# Patient Record
Sex: MALE | Race: WHITE | NOT HISPANIC OR LATINO | Employment: FULL TIME
[De-identification: names, ages, dates, MRNs, and addresses within clinical notes are randomized per-mention and may not be internally consistent; named-entity substitution may affect disease eponyms.]

---

## 2017-01-08 ENCOUNTER — RECORDS - HEALTHEAST (OUTPATIENT)
Dept: SLEEP MEDICINE | Age: 32
End: 2017-01-08

## 2017-01-08 ENCOUNTER — RECORDS - HEALTHEAST (OUTPATIENT)
Dept: ADMINISTRATIVE | Facility: OTHER | Age: 32
End: 2017-01-08

## 2017-01-08 DIAGNOSIS — G47.33 OBSTRUCTIVE SLEEP APNEA (ADULT) (PEDIATRIC): ICD-10-CM

## 2017-01-08 DIAGNOSIS — S06.0X9A CONCUSSION WITH LOSS OF CONSCIOUSNESS OF UNSPECIFIED DURATION, INITIAL ENCOUNTER: ICD-10-CM

## 2017-01-08 DIAGNOSIS — R51.9 HEADACHE: ICD-10-CM

## 2021-03-28 ENCOUNTER — HOSPITAL ENCOUNTER (EMERGENCY)
Facility: CLINIC | Age: 36
Discharge: HOME | End: 2021-03-28
Attending: EMERGENCY MEDICINE | Admitting: EMERGENCY MEDICINE
Payer: COMMERCIAL

## 2021-03-28 ENCOUNTER — OFFICE VISIT (OUTPATIENT)
Dept: URGENT CARE | Facility: URGENT CARE | Age: 36
End: 2021-03-28
Payer: COMMERCIAL

## 2021-03-28 ENCOUNTER — NURSE TRIAGE (OUTPATIENT)
Dept: NURSING | Facility: CLINIC | Age: 36
End: 2021-03-28

## 2021-03-28 VITALS
DIASTOLIC BLOOD PRESSURE: 87 MMHG | HEART RATE: 76 BPM | RESPIRATION RATE: 18 BRPM | SYSTOLIC BLOOD PRESSURE: 148 MMHG | OXYGEN SATURATION: 100 % | TEMPERATURE: 97.2 F

## 2021-03-28 VITALS
HEART RATE: 74 BPM | WEIGHT: 239 LBS | TEMPERATURE: 98.4 F | SYSTOLIC BLOOD PRESSURE: 136 MMHG | DIASTOLIC BLOOD PRESSURE: 75 MMHG | OXYGEN SATURATION: 94 %

## 2021-03-28 DIAGNOSIS — Z72.0 TOBACCO ABUSE: ICD-10-CM

## 2021-03-28 DIAGNOSIS — B02.8 HERPES ZOSTER WITH COMPLICATION: Primary | ICD-10-CM

## 2021-03-28 DIAGNOSIS — B02.9 HERPES ZOSTER WITHOUT COMPLICATION: ICD-10-CM

## 2021-03-28 PROCEDURE — 99205 OFFICE O/P NEW HI 60 MIN: CPT | Performed by: PHYSICIAN ASSISTANT

## 2021-03-28 PROCEDURE — 99283 EMERGENCY DEPT VISIT LOW MDM: CPT

## 2021-03-28 PROCEDURE — 250N000009 HC RX 250: Performed by: EMERGENCY MEDICINE

## 2021-03-28 PROCEDURE — 250N000009 HC RX 250

## 2021-03-28 RX ORDER — VALACYCLOVIR HYDROCHLORIDE 1 G/1
1000 TABLET, FILM COATED ORAL 3 TIMES DAILY
Qty: 21 TABLET | Refills: 0 | Status: SHIPPED | OUTPATIENT
Start: 2021-03-28 | End: 2022-01-14

## 2021-03-28 RX ORDER — TETRACAINE HYDROCHLORIDE 5 MG/ML
2 SOLUTION OPHTHALMIC ONCE
Status: DISCONTINUED | OUTPATIENT
Start: 2021-03-28 | End: 2021-03-28 | Stop reason: HOSPADM

## 2021-03-28 RX ORDER — BUPROPION HYDROCHLORIDE 150 MG/1
150 TABLET ORAL EVERY MORNING
COMMUNITY
End: 2022-01-14

## 2021-03-28 NOTE — ED PROVIDER NOTES
History   Chief Complaint:  Rash     The history is provided by the patient and medical records.      oM Armstrong is a 35 year old male smoker who presents with a rash. He had right sided face pain starting near his ear and somewhat radiating down as well beginning a week ago. 3 days ago he developed a red painful rash on his right forehead that his mother, who is a nurse, suggested was shingles. Today, he noticed a new small cluster of rash and his right eye seemed swollen. He called nurse triage and this prompted evaluation. He denies any vision changes or eye pain. He denies dental pain.    Review of Systems   HENT: Positive for facial swelling (right eye). Negative for dental problem.         Facial pain   Eyes: Negative for pain, redness and visual disturbance.   Skin: Positive for rash (painful, red).   All other systems reviewed and are negative.    Allergies:  The patient has no known allergies.     Medications:  Wellbutrin     Past Medical History:    The patient denies past medical history.     Past Surgical History:    ACL reconstruction  Wrist reconstruction   Concussion    Social History:  Patient presents with significant other at bedside.   Has 3 children.   Is a smoker.     Physical Exam     Patient Vitals for the past 24 hrs:   BP Temp Temp src Pulse Resp SpO2   03/28/21 1735 (!) 148/87 97.2  F (36.2  C) Temporal 76 18 100 %       Physical Exam  General: Well-developed and well-nourished. Well appearing young  man. Cooperative.  Head:  Atraumatic.  Eyes:  Conjunctivae and sclerae are normal.  There is mild edema of the right superior eyelid/supraorbital ridge.  No discharge.  PERRL.  With fluorescein staining on the right, there is no uptake including no dendritic lesions or corneal abrasion/ulceration.  Red reflex is present.  There is no hyphema or hypopyon.  No photophobia.  ENT:    Normal nose. Moist mucous membranes.  No dental tenderness or intraoral swelling/masses.  Right TM  clear.  Neck:  Supple. Normal range of motion.  Resp:  No respiratory distress.   GI:  Non-distended.    MS:  Normal ROM.   Skin:  Warm. Non-diaphoretic. No pallor. There is a cluster of erythematous vesicular lesions just superior to the right eyebrow as photographed below. Possible very small/early cluster on the right temple.  Neuro:  Awake. A&Ox3. Normal strength.  Psych: Normal mood and affect. Normal speech.  Vitals reviewed.            Emergency Department Course     Emergency Department Course:    Reviewed:  I reviewed the patient's nursing notes, vitals, past medical records, and Care Everywhere.     Assessments:  1738    I evaluated the patient and performed an exam as above.    1755 I performed an eye exam with a Wood's lamp as above. He is comfortable with discharge at this time.     Disposition:  The patient was discharged home.     Impression & Plan   Medical Decision Making:  Mo is a healthy 35-year-old man who had some facial pain for the last several days.  3 days ago he noticed a rash on his right forehead and since then has noticed another cluster in the temple starting just today.  When he noticed swelling of his eyelid, he contacted nurse triage who recommended evaluation.  Fortunately, he denies vision changes, eye pain, or any other concerns and appears quite well on exam.  He does have a cluster of vesicular lesions on the right forehead consistent with herpes zoster.  With fluorescein staining, there is no evidence of herpes ophthalmicus with no dendritic lesions, corneal abrasions, or corneal ulcerations.  However, because he continues to note new lesions, Mo understands that he could develop herpes ophthalmicus going forward.  He agrees to follow-up with ophthalmology in the next several days for reassessment to look for new dendritic lesions.  Because he is still developing lesions, he is appropriate for antiviral treatment and agrees to start Valtrex.  Mo and I discussed avoidance  of contact with elderly, pregnant, or other unimmunized individuals until his lesions have crusted over.  We also discussed return precautions including, but not limited to, eye pain, vision changes, evidence of secondary bacterial infection, fever, or any other concerns.  All of his and his wife's questions were answered and they verbalized understanding.  Amenable to discharge.    Diagnosis:    ICD-10-CM    1. Herpes zoster without complication  B02.9    2. Tobacco abuse  Z72.0        Discharge Medications:  Discharge Medication List as of 3/28/2021  6:14 PM      START taking these medications    Details   valACYclovir (VALTREX) 1000 mg tablet Take 1 tablet (1,000 mg) by mouth 3 times daily for 7 days, Disp-21 tablet, R-0, E-Prescribe             Scribe Disclosure:  I, Ankita Hassan, am serving as a scribe at 5:39 PM on 3/28/2021 to document services personally performed by Meme Henderson MD based on my observations and the provider's statements to me.      Meme Hendesron MD  03/29/21 2040

## 2021-03-28 NOTE — PATIENT INSTRUCTIONS
March 28, 2021 [facility]:     Go to the emergency room now for further evaluation of your shingles infection, your right eye droop and the swelling in your right temple area.     I feel it is important that you have a slit lamp exam of your eye. If you have Shingles lesions on your cornea, it can lead to blindness. You also appear to have developed a secondary bacterial infection (called facial cellulitis) in your right temple area where it is swollen, red and warm.

## 2021-03-28 NOTE — ED TRIAGE NOTES
Patient presents with complaints of shingles. Patient states his eye started swelling today as well. Denies any vision changes. ABC intact without need for intervention at this time.

## 2021-03-28 NOTE — DISCHARGE INSTRUCTIONS
There are no lesions in your eye today. However, since you are still developing lesions, you could get them in your eye. You need to see an ophthalmologist for a recheck to look for lesions this week.  Take Valtrex as prescribed to help stop formation of new vesicles.  Avoid contact with elderly, pregnant, or other unimmunized individuals until all lesions are completely crusted over.   Return with severe pain, blurred vision/vision change, eye pain, redness around the lesions, fever, or ANY OTHER new or concerning symptoms.  Stop smoking.

## 2021-03-28 NOTE — TELEPHONE ENCOUNTER
Pt broke with Shingles this weekend, right side of face and now around his eyes.  Pt will follow Care Advice and go to Urgent Care today.  Unique Holman RN, MA  [facility] Nurse Advisor           Additional Information    Shingles rash on the eyelid or tip of the nose    Protocols used: SHINGLES-A-AH

## 2021-03-28 NOTE — PROGRESS NOTES
ASSESSMENT/PLAN:    (B02.8) Herpes zoster with complication  (primary encounter diagnosis)    MDM:  Classic Zoster lesions--right sided ophthalmic distribution. Right sided eye droop. This is in a dermatomal pattern that is concerning for complication of right eye involvement. Additionally patient has acute development today of warmth, redness and swelling in right temple area raising suspicion for secondary facial cellulitis.     PLAN: I have advised he be seen now in ER for slit lamp evaluation of eye and for further evaluation of eye droop and possible secondary facial cellulitis. I phoned ahead to [facility] ([facility]). They are expecting his arrival shortly via private car.     Below is copy of discharge summary I provided patient. He verbalizes understanding of and agrees to this plan.    _______________________________________________________________________    March 28, 2021 [facility]:     Go to the emergency room now for further evaluation of your shingles infection, your right eye droop and the swelling in your right temple area.    I feel it is important that you have a slit lamp exam of your eye. If you have Shingles lesions on your cornea, it can lead to blindness. You also appear to have developed a secondary bacterial infection (called facial cellulitis) in your right temple area where it is swollen, red and warm.       SUBJECTIVE:     Mo Armstrong presents to  today for evaluation of painful rash on right forehead, droopy right eyelid. Now, today, he has also developed redness, heat and swelling in right temple area.     HPI: Patient noted some pain in forehead and scalp 7 days ago. Thursday (3 days ago) he developed blisters in right forehead and pain in right scalp. In past 24 hours, he has developed right eyelid drooping, redness, heat and swelling in right temple area.     Patient had Chickenpox in childhood. No prior Shingles outbreaks     ROS:   CONSITUTIONAL:  No fever, fatigue or other  systemic sxs.   SKIN: Positive as per above HPI. No rash or blisters elsewhere  NEURO: Positive for eyelid droop as per above. Patient states he had a left sided headache yesterday. No headache today.  No severe headache. Denies any loss of visual acuity.  Denies any severe headaches, neck stiffness, photophobia, mental status changes or lethargy.           OBJECTIVE:  /75   Pulse 74   Temp 98.4  F (36.9  C)   Wt 108.4 kg (239 lb)   SpO2 94%       GENERAL:  Very pleasant, comfortable and generally well appearing.  SKIN: Typical zoster lesions on erythematous bases in clusters right forehead in ophthalmic branch distribution. Patient also has warmth, swelling and tenderness in right temple area.  RIGHT EYE: Significant right upper eyelid droop is noted. Sclera clear.   NECK: FROM neck without any pain or stiffness.   NEURO: Positive for ptosis right lid.  Alert and oriented.  NormalNo eye ptosis on left. No left sided facial droop. CN II/XII grossly intact.  Gait within normal limits.

## 2021-03-29 ASSESSMENT — ENCOUNTER SYMPTOMS
FACIAL SWELLING: 1
EYE PAIN: 0
EYE REDNESS: 0

## 2021-03-31 ENCOUNTER — HOSPITAL ENCOUNTER (EMERGENCY)
Facility: CLINIC | Age: 36
Discharge: HOME | End: 2021-03-31
Attending: EMERGENCY MEDICINE | Admitting: EMERGENCY MEDICINE
Payer: COMMERCIAL

## 2021-03-31 VITALS
RESPIRATION RATE: 16 BRPM | TEMPERATURE: 97.1 F | BODY MASS INDEX: 33.6 KG/M2 | HEIGHT: 71 IN | DIASTOLIC BLOOD PRESSURE: 67 MMHG | OXYGEN SATURATION: 98 % | WEIGHT: 240 LBS | HEART RATE: 83 BPM | SYSTOLIC BLOOD PRESSURE: 123 MMHG

## 2021-03-31 DIAGNOSIS — B02.29 HERPES ZOSTER VIRUS INFECTION OF FACE AND EAR NERVES: ICD-10-CM

## 2021-03-31 PROCEDURE — 99283 EMERGENCY DEPT VISIT LOW MDM: CPT

## 2021-03-31 PROCEDURE — 250N000012 HC RX MED GY IP 250 OP 636 PS 637: Performed by: EMERGENCY MEDICINE

## 2021-03-31 RX ORDER — PREDNISONE 20 MG/1
60 TABLET ORAL DAILY
Qty: 30 TABLET | Refills: 0 | Status: SHIPPED | OUTPATIENT
Start: 2021-03-31 | End: 2021-04-10

## 2021-03-31 RX ORDER — PREDNISONE 20 MG/1
60 TABLET ORAL ONCE
Status: COMPLETED | OUTPATIENT
Start: 2021-03-31 | End: 2021-03-31

## 2021-03-31 RX ORDER — GABAPENTIN 300 MG/1
300 CAPSULE ORAL 3 TIMES DAILY PRN
Qty: 90 CAPSULE | Refills: 0 | Status: SHIPPED | OUTPATIENT
Start: 2021-03-31 | End: 2022-01-14

## 2021-03-31 RX ADMIN — PREDNISONE 60 MG: 20 TABLET ORAL at 08:18

## 2021-03-31 ASSESSMENT — ENCOUNTER SYMPTOMS
FACIAL SWELLING: 1
DIZZINESS: 0
EYE PAIN: 0
NUMBNESS: 0
NECK STIFFNESS: 0

## 2021-03-31 ASSESSMENT — MIFFLIN-ST. JEOR: SCORE: 2040.76

## 2021-03-31 NOTE — DISCHARGE INSTRUCTIONS
A prescription for the steroids was prescribed, but do not fill this unless you are unable to see ENT clinic by tomorrow. They will prescribe what the think is the best regimen.

## 2021-03-31 NOTE — ED TRIAGE NOTES
Patient seen here Sunday, diagnosed with shingles, seen at [facility] yesterday, today worse swelling right eye and right cheek. No change in vision.

## 2021-03-31 NOTE — ED PROVIDER NOTES
"History     Chief Complaint:  Shingles       The history is provided by the patient.     Mo Armstrong is a 35 year old male with a history of migraines who presents for evaluation of facial swelling. The patient first began to have ear pain and a rash above his right eyebrow ten days ago. He then began to have right eye swelling three days ago, leading him to present to the ED. He was then seen at [facility] yesterday for evaluation and his eye appeared unaffected per pt report. This morning, his right eyelid and right sided facial swelling was worsened, leading him to again present. He has also been having weakness and pain to the right side of his face for the past three days. He has been compliant with his Valtrex. He has not been on any eye drops or prednisone.     Here, he also shares that he has difficulty tasting his food last night for dinner. He denies eye pain, vision changes, dizziness, hearing changes, neck stiffness, or arm numbness or weakness.       Review of Systems   HENT: Positive for ear pain (right) and facial swelling (right side). Negative for hearing loss.         Positive for change in taste   Eyes: Negative for pain and visual disturbance.   Musculoskeletal: Negative for neck stiffness.   Skin: Positive for rash (right sided face).   Neurological: Negative for dizziness and numbness (arms).        Negative for arm tingling   All other systems reviewed and are negative.    Allergies:  No Known Drug Allergies      Medications:   Bupropion   Valtrex     Medical History:   Concussion x4  Migraines     Surgical History:  ACL repair  MCL and LCL repair, elbow   Wrist reconstruction     Social History:  The patient was unaccompanied to the ED.  Marital Status:       Physical Exam     Patient Vitals for the past 24 hrs:   BP Temp Temp src Pulse Resp SpO2 Height Weight   03/31/21 0726 (!) 149/105 97.1  F (36.2  C) Temporal 74 16 98 % 1.803 m (5' 11\") 108.9 kg (240 lb)          Physical " Exam    VS: Reviewed per above  HENT: Mucous membranes moist, no nuchal rigidity.  EYES: sclera anicteric  CV: Rate as noted, regular rhythm.   RESP: Effort normal. Breath sounds are normal bilaterally.  NEURO: GCS 15, House-Brackman grade 3-4 right facial weakness: He has facial asymmetry at rest. Complete eye closure on right with maximal effort. Able to minimally raise the right eye brow and minimal movement with attempted smile on the right.  5-5 strength in all 4 extremities, sensation intact to light touch in all 4 extremities.  MSK: No deformity of the extremities  SKIN: Warm and dry, mild right facial soft tissue swelling without redness or induration or warmth.  Scattered pustular lesions over the right eyebrow.  No lesions appreciated in the right auditory canal or auricle.       Emergency Department Course     Emergency Department Course:    Reviewed:  I reviewed the patient's nursing notes, vitals, past medical records, Care Everywhere.     Assessments:  0735 I performed an exam of the patient, as documented above.   0803 Patient rechecked and updated following consultation with ENT. He understands and is in agreement with the plan for outpatient follow up with ENT.     Consults:   0756 I spoke with Dr. Forrest of the ENT service regarding patient's presentation, findings, and plan of care.     Interventions:   Prednisone 60 mg PO    Disposition:  The patient was discharged to home.     Impression & Plan     Medical Decision Making:  Patient presents to the ER for evaluation of right facial swelling, weakness, rash.  On arrival vital signs are reassuring.  Per chart review, patient was diagnosed with zoster 3 days ago in the ER and was seen by ophthalmology yesterday, and was told that there was no involvement of the right eye.  Today patient returns for worsening swelling and pain of the right face.  At this time, he has House-Brackman grade 3 or 4 weakness on the right face.  He has some appreciable  soft tissue swelling of the face but no evidence of skin or soft tissue infection.  Despite lack of objective involvement of the right ear, I was concerned for Shaka Hunt syndrome.  I spoke with on-call otolaryngology and they recommended patient be seen in their clinic today.  They also agreed with initiating prednisone.  He was given first dose of prednisone here in the ER.  I encouraged him to continue taking the Valtrex.  I also prescribed gabapentin for nerve related pain.  I did prescribe a 10-day course of prednisone, but recommended patient hold off on filling this prescription until he receives formal recommendation about prednisone taper from otolaryngology today.      Diagnosis:     ICD-10-CM    1. Herpes zoster virus infection of face and ear nerves  B02.29         Discharge Medications:  New Prescriptions    GABAPENTIN (NEURONTIN) 300 MG CAPSULE    Take 1 capsule (300 mg) by mouth 3 times daily as needed (facial pain)    PREDNISONE (DELTASONE) 20 MG TABLET    Take 3 tablets (60 mg) by mouth daily for 10 days       Scribe Disclosure:  I, Mary Chavez, am serving as a scribe at 7:30 AM on 3/31/2021 to document services personally performed by Shakeel Buckley MD based on my observations and the provider's statements to me.      Shakeel Buckley MD  03/31/21 9790

## 2021-05-29 ENCOUNTER — RECORDS - HEALTHEAST (OUTPATIENT)
Dept: ADMINISTRATIVE | Facility: CLINIC | Age: 36
End: 2021-05-29

## 2021-09-30 ENCOUNTER — OFFICE VISIT (OUTPATIENT)
Dept: PEDIATRICS | Facility: CLINIC | Age: 36
End: 2021-09-30
Payer: COMMERCIAL

## 2021-09-30 VITALS
HEIGHT: 71 IN | WEIGHT: 230 LBS | RESPIRATION RATE: 20 BRPM | SYSTOLIC BLOOD PRESSURE: 130 MMHG | DIASTOLIC BLOOD PRESSURE: 84 MMHG | BODY MASS INDEX: 32.2 KG/M2 | HEART RATE: 88 BPM | TEMPERATURE: 98.4 F

## 2021-09-30 DIAGNOSIS — F43.23 ADJUSTMENT DISORDER WITH MIXED ANXIETY AND DEPRESSED MOOD: Primary | ICD-10-CM

## 2021-09-30 DIAGNOSIS — F17.200 SMOKER: ICD-10-CM

## 2021-09-30 PROCEDURE — 99214 OFFICE O/P EST MOD 30 MIN: CPT | Performed by: NURSE PRACTITIONER

## 2021-09-30 RX ORDER — CITALOPRAM HYDROBROMIDE 20 MG/1
TABLET ORAL
Qty: 49 TABLET | Refills: 0 | Status: SHIPPED | OUTPATIENT
Start: 2021-09-30 | End: 2022-01-14

## 2021-09-30 ASSESSMENT — ANXIETY QUESTIONNAIRES
8. IF YOU CHECKED OFF ANY PROBLEMS, HOW DIFFICULT HAVE THESE MADE IT FOR YOU TO DO YOUR WORK, TAKE CARE OF THINGS AT HOME, OR GET ALONG WITH OTHER PEOPLE?: EXTREMELY DIFFICULT
4. TROUBLE RELAXING: MORE THAN HALF THE DAYS
7. FEELING AFRAID AS IF SOMETHING AWFUL MIGHT HAPPEN: NEARLY EVERY DAY
7. FEELING AFRAID AS IF SOMETHING AWFUL MIGHT HAPPEN: NEARLY EVERY DAY
3. WORRYING TOO MUCH ABOUT DIFFERENT THINGS: NEARLY EVERY DAY
1. FEELING NERVOUS, ANXIOUS, OR ON EDGE: NEARLY EVERY DAY
5. BEING SO RESTLESS THAT IT IS HARD TO SIT STILL: MORE THAN HALF THE DAYS
GAD7 TOTAL SCORE: 19
2. NOT BEING ABLE TO STOP OR CONTROL WORRYING: NEARLY EVERY DAY
6. BECOMING EASILY ANNOYED OR IRRITABLE: NEARLY EVERY DAY
GAD7 TOTAL SCORE: 19
GAD7 TOTAL SCORE: 19

## 2021-09-30 ASSESSMENT — PATIENT HEALTH QUESTIONNAIRE - PHQ9
SUM OF ALL RESPONSES TO PHQ QUESTIONS 1-9: 17
10. IF YOU CHECKED OFF ANY PROBLEMS, HOW DIFFICULT HAVE THESE PROBLEMS MADE IT FOR YOU TO DO YOUR WORK, TAKE CARE OF THINGS AT HOME, OR GET ALONG WITH OTHER PEOPLE: EXTREMELY DIFFICULT
SUM OF ALL RESPONSES TO PHQ QUESTIONS 1-9: 17

## 2021-09-30 ASSESSMENT — MIFFLIN-ST. JEOR: SCORE: 1990.4

## 2021-09-30 NOTE — PROGRESS NOTES
"    Assessment & Plan     Adjustment disorder with mixed anxiety and depressed mood  He had previously been on wellbutrin and not helping. He has more symptoms of anxeity at this time. Will try celexa, which he has been on in the psat with good effect, although it was when he was a teenager. Discussed in depth the biochemical nature of depression/anxiety and the mechanism of action of SSRI medication.  Discussed black box warning of suicidality and instructed to go to er if she exhibits these symptoms.  Instructed to f/u in 6-8 weeks to evaluate progess.  - citalopram (CELEXA) 20 MG tablet; Take 0.5 tablets (10 mg) by mouth daily for 7 days, THEN 0.5 tablets (10 mg) daily.    Smoker  He has quit successfully with chantix in the past but understands it[place] market [place], but we can order it through [facility]. He would like to consider after getting mood improved.            Tobacco Cessation:   reports that he has been smoking cigarettes. He has been smoking about 1.00 pack per day. He has never used smokeless tobacco.  Tobacco Cessation Action Plan: Information offered: Patient not interested at this time    BMI:   Estimated body mass index is 32.08 kg/m  as calculated from the following:    Height as of this encounter: 1.803 m (5' 11\").    Weight as of this encounter: 104.3 kg (230 lb).       Depression Screening Follow Up    PHQ 9/30/2021   PHQ-9 Total Score 17   Q9: Thoughts of better off dead/self-harm past 2 weeks Not at all     Last PHQ-9 9/30/2021   1.  Little interest or pleasure in doing things 0   2.  Feeling down, depressed, or hopeless 3   3.  Trouble falling or staying asleep, or sleeping too much 3   4.  Feeling tired or having little energy 3   5.  Poor appetite or overeating 2   6.  Feeling bad about yourself 1   7.  Trouble concentrating 3   8.  Moving slowly or restless 2   Q9: Thoughts of better off dead/self-harm past 2 weeks 0   PHQ-9 Total Score 17       Follow Up Actions Taken  Crisis " resource information provided in After Visit Summary         Return in about 1 month (around 10/30/2021) for Follow up, Face to face office visit.    JOCELYNE [facility] DIANE Hassan is a 36 year old who presents for the following health issues     History of Present Illness       Mental Health Follow-up:  Patient presents to follow-up on Depression & Anxiety.Patient's depression since last visit has been:  Worse  The patient is not having other symptoms associated with depression.  Patient's anxiety since last visit has been:  Worse  The patient is not having other symptoms associated with anxiety.  Any significant life events: job concerns, financial concerns and grief or loss  Patient is feeling anxious or having panic attacks.  Patient has no concerns about alcohol or drug use.     Social History  Tobacco Use    Smoking status: Current Every Day Smoker      Packs/day: 1.00      Types: Cigarettes    Smokeless tobacco: Never Used  Alcohol use: Not on file  Drug use: Not on file      Today's PHQ-9         PHQ-9 Total Score:     (P) 17   PHQ-9 Q9 Thoughts of better off dead/self-harm past 2 weeks :   (P) Not at all   Thoughts of suicide or self harm:      Self-harm Plan:        Self-harm Action:          Safety concerns for self or others:           He eats 0-1 servings of fruits and vegetables daily.He consumes 1 sweetened beverage(s) daily.He exercises with enough effort to increase his heart rate 9 or less minutes per day.  He exercises with enough effort to increase his heart rate 3 or less days per week.   He is taking medications regularly.     History of anxiety, used to be on wellbutrin for this, and tried restarting but it didn't help. He is a smoker and wants to kid.      He is , sees kids every other weekend. Job is very busy. He doesn't drink much at all, eating out all the time, appetitive low. Denies drug use.     He used to be on celexa as a  "teen and it worked well.     Review of Systems   Constitutional, HEENT, cardiovascular, pulmonary, GI, , musculoskeletal, neuro, skin, endocrine and psych systems are negative, except as otherwise noted.      Objective    /84   Pulse 88   Temp 98.4  F (36.9  C) (Oral)   Resp 20   Ht 1.803 m (5' 11\")   Wt 104.3 kg (230 lb)   BMI 32.08 kg/m    Body mass index is 32.08 kg/m .  Physical Exam   GENERAL: healthy, alert and no distress  PSYCH: mentation appears normal, affect normal/bright            Answers for HPI/ROS submitted by the patient on 9/30/2021  If you checked off any problems, how difficult have these problems made it for you to do your work, take care of things at home, or get along with other people?: Extremely difficult  PHQ9 TOTAL SCORE: 17  ANOOP 7 TOTAL SCORE: 19          "

## 2021-09-30 NOTE — PATIENT INSTRUCTIONS
Depression and/or Anxiety:  A prescription has been faxed to your pharmacy.  Continue to work on a healthy diet and routine exercise and consider a therapist. We talked about being self-aware of boundaries, mindfulness and/or meditative practice, scheduling fun things to do, reaching out to your supports. [facility] (211) is a resource to connect you to crisis counseling (in person or by phone).  Schedule a follow up appointment in about 4-6 weeks with your progress, or sooner if you have any questions or concerns.  Be aware of the very smalll risk of increased symptoms of depression and/or anxiety when starting these medications.

## 2021-10-01 ASSESSMENT — PATIENT HEALTH QUESTIONNAIRE - PHQ9: SUM OF ALL RESPONSES TO PHQ QUESTIONS 1-9: 17

## 2021-10-01 ASSESSMENT — ANXIETY QUESTIONNAIRES: GAD7 TOTAL SCORE: 19

## 2022-01-14 ENCOUNTER — OFFICE VISIT (OUTPATIENT)
Dept: PEDIATRICS | Facility: CLINIC | Age: 37
End: 2022-01-14
Payer: COMMERCIAL

## 2022-01-14 VITALS
DIASTOLIC BLOOD PRESSURE: 72 MMHG | OXYGEN SATURATION: 100 % | HEIGHT: 71 IN | RESPIRATION RATE: 18 BRPM | SYSTOLIC BLOOD PRESSURE: 124 MMHG | HEART RATE: 74 BPM | TEMPERATURE: 97.4 F | WEIGHT: 225 LBS | BODY MASS INDEX: 31.5 KG/M2

## 2022-01-14 DIAGNOSIS — F43.23 ADJUSTMENT DISORDER WITH MIXED ANXIETY AND DEPRESSED MOOD: ICD-10-CM

## 2022-01-14 DIAGNOSIS — E87.5 SERUM POTASSIUM ELEVATED: ICD-10-CM

## 2022-01-14 DIAGNOSIS — Z01.818 PREOP GENERAL PHYSICAL EXAM: Primary | ICD-10-CM

## 2022-01-14 LAB
ANION GAP SERPL CALCULATED.3IONS-SCNC: 4 MMOL/L (ref 3–14)
BUN SERPL-MCNC: 14 MG/DL (ref 7–30)
CALCIUM SERPL-MCNC: 9.3 MG/DL (ref 8.5–10.1)
CHLORIDE BLD-SCNC: 108 MMOL/L (ref 94–109)
CO2 SERPL-SCNC: 29 MMOL/L (ref 20–32)
CREAT SERPL-MCNC: 1.1 MG/DL (ref 0.66–1.25)
ERYTHROCYTE [DISTWIDTH] IN BLOOD BY AUTOMATED COUNT: 13.1 % (ref 10–15)
GFR SERPL CREATININE-BSD FRML MDRD: 89 ML/MIN/1.73M2
GLUCOSE BLD-MCNC: 120 MG/DL (ref 70–99)
HCT VFR BLD AUTO: 48.2 % (ref 40–53)
HGB BLD-MCNC: 16.5 G/DL (ref 13.3–17.7)
MCH RBC QN AUTO: 30.2 PG (ref 26.5–33)
MCHC RBC AUTO-ENTMCNC: 34.2 G/DL (ref 31.5–36.5)
MCV RBC AUTO: 88 FL (ref 78–100)
PLATELET # BLD AUTO: 295 10E3/UL (ref 150–450)
POTASSIUM BLD-SCNC: 5.5 MMOL/L (ref 3.4–5.3)
RBC # BLD AUTO: 5.47 10E6/UL (ref 4.4–5.9)
SODIUM SERPL-SCNC: 141 MMOL/L (ref 133–144)
WBC # BLD AUTO: 6.4 10E3/UL (ref 4–11)

## 2022-01-14 PROCEDURE — 36415 COLL VENOUS BLD VENIPUNCTURE: CPT | Performed by: NURSE PRACTITIONER

## 2022-01-14 PROCEDURE — 99214 OFFICE O/P EST MOD 30 MIN: CPT | Performed by: NURSE PRACTITIONER

## 2022-01-14 PROCEDURE — 85027 COMPLETE CBC AUTOMATED: CPT | Performed by: NURSE PRACTITIONER

## 2022-01-14 PROCEDURE — 80048 BASIC METABOLIC PNL TOTAL CA: CPT | Performed by: NURSE PRACTITIONER

## 2022-01-14 PROCEDURE — 96127 BRIEF EMOTIONAL/BEHAV ASSMT: CPT | Performed by: NURSE PRACTITIONER

## 2022-01-14 RX ORDER — TRAMADOL HYDROCHLORIDE 50 MG/1
TABLET ORAL
COMMUNITY
Start: 2022-01-04 | End: 2023-02-28

## 2022-01-14 ASSESSMENT — PATIENT HEALTH QUESTIONNAIRE - PHQ9
SUM OF ALL RESPONSES TO PHQ QUESTIONS 1-9: 8
SUM OF ALL RESPONSES TO PHQ QUESTIONS 1-9: 8
10. IF YOU CHECKED OFF ANY PROBLEMS, HOW DIFFICULT HAVE THESE PROBLEMS MADE IT FOR YOU TO DO YOUR WORK, TAKE CARE OF THINGS AT HOME, OR GET ALONG WITH OTHER PEOPLE: SOMEWHAT DIFFICULT

## 2022-01-14 ASSESSMENT — ANXIETY QUESTIONNAIRES
7. FEELING AFRAID AS IF SOMETHING AWFUL MIGHT HAPPEN: SEVERAL DAYS
GAD7 TOTAL SCORE: 13
3. WORRYING TOO MUCH ABOUT DIFFERENT THINGS: MORE THAN HALF THE DAYS
GAD7 TOTAL SCORE: 13
2. NOT BEING ABLE TO STOP OR CONTROL WORRYING: MORE THAN HALF THE DAYS
6. BECOMING EASILY ANNOYED OR IRRITABLE: NEARLY EVERY DAY
7. FEELING AFRAID AS IF SOMETHING AWFUL MIGHT HAPPEN: SEVERAL DAYS
5. BEING SO RESTLESS THAT IT IS HARD TO SIT STILL: SEVERAL DAYS
1. FEELING NERVOUS, ANXIOUS, OR ON EDGE: MORE THAN HALF THE DAYS
4. TROUBLE RELAXING: MORE THAN HALF THE DAYS
GAD7 TOTAL SCORE: 13

## 2022-01-14 ASSESSMENT — MIFFLIN-ST. JEOR: SCORE: 1967.72

## 2022-01-14 NOTE — PROGRESS NOTES
[facility]  [address]  [address]  [place] [address]  Phone: [phone]  Fax: [phone]  Primary Provider: No Ref-Primary, Physician  Pre-op Performing Provider: KAMILAH ESCOBAR      PREOPERATIVE EVALUATION:  Today's date: 1/14/2022    Mo Armstrong is a 36 year old male who presents for a preoperative evaluation.    Surgical Information:  Surgery/Procedure: Spinal Fusion  Surgery Location: [facility]  Surgeon:   Surgery Date: 1/17/22  Time of Surgery: TBD  Where patient plans to recover: At home with family  Fax number for surgical facility: [phone]    Type of Anesthesia Anticipated: to be determined    Assessment & Plan     The proposed surgical procedure is considered INTERMEDIATE risk.    Preop general physical exam  - No history of CKD, diabetes, HTN or anemia so he does not require labs, but they were done per request of [facility]. Labs to be faxed to [facility] once available. Ordered stat as surgery is on Monday. Patient will be able to view on [employer] once proccessed  - Basic metabolic panel  (Ca, Cl, CO2, Creat, Gluc, K, Na, BUN); Future  - CBC with platelets; Future  - Basic metabolic panel  (Ca, Cl, CO2, Creat, Gluc, K, Na, BUN)  - CBC with platelets    Adjustment disorder with mixed anxiety and depressed mood  - We discussed restarting citalopram. He notes difficulty coping, no suicidal thoughts. As he is taking 200 mg Tramadol daily, concern for serotonin syndrome. He will restart citalopram in 1-2 weeks once off of pain medications. Mood currently affected by chronic pain, but he is doing ok currently and agrees that he can wait to start  - F/U 1 month after restarting citalopram    Serum potassium elevated  - Potassium 5.5. I called patient to discuss results. Unfortunately, it is Friday and surgery scheduled for Monday which doesn't allow time for me to recheck. He is not taking any supplement/vitamins. He denies a history of  hyperkaldemia. Ensure hydration. Urine is normal light straw color. No dark urine or decline in renal function to suggest rhabdo.     If needing to take Tramadol morning of surgery, needs to notify surgical team of dosing     RECOMMENDATION:  APPROVAL GIVEN to proceed with proposed procedure. I recommend having stat potassium checked morning of surgery to ensure it has not increased further.         Subjective     HPI related to upcoming procedure: Cervicalgia with radiculopathy    Preop Questions 1/14/2022   1. Have you ever had a heart attack or stroke? No   2. Have you ever had surgery on your heart or blood vessels, such as a stent placement, a coronary artery bypass, or surgery on an artery in your head, neck, heart, or legs? No   3. Do you have chest pain with activity? No   4. Do you have a history of  heart failure? No   5. Do you currently have a cold, bronchitis or symptoms of other infection? No   6. Do you have a cough, shortness of breath, or wheezing? No   7. Do you or anyone in your family have previous history of blood clots? No   8. Do you or does anyone in your family have a serious bleeding problem such as prolonged bleeding following surgeries or cuts? No   9. Have you ever had problems with anemia or been told to take iron pills? No   10. Have you had any abnormal blood loss such as black, tarry or bloody stools? No   11. Have you ever had a blood transfusion? No   12. Are you willing to have a blood transfusion if it is medically needed before, during, or after your surgery? Yes   13. Have you or any of your relatives ever had problems with anesthesia? No   14. Do you have sleep apnea, excessive snoring or daytime drowsiness? No   15. Do you have any artifical heart valves or other implanted medical devices like a pacemaker, defibrillator, or continuous glucose monitor? No   16. Do you have artificial joints? No   17. Are you allergic to latex? No       Health Care Directive:  Patient does not  have a Health Care Directive or Living Will    Preoperative Review of :   reviewed - controlled substances reflected in medication list.      Adjustment disorder - stopped Celexa 3 months ago. Feels like he is doing well. But also notes chronic pain is affecting his mood.     Currently on Tramadol 100 mg in the am, 50 mg afternoon and 50 mg in the evening    Review of Systems  CONSTITUTIONAL: NEGATIVE for fever, chills, change in weight  INTEGUMENTARY/SKIN: NEGATIVE for worrisome rashes, moles or lesions  EYES: NEGATIVE for vision changes or irritation  ENT/MOUTH: NEGATIVE for ear, mouth and throat problems  RESP: NEGATIVE for significant cough or SOB  CV: NEGATIVE for chest pain, palpitations or peripheral edema  GI: NEGATIVE for nausea, abdominal pain, heartburn, or change in bowel habits  : NEGATIVE for frequency, dysuria, or hematuria  MUSCULOSKELETAL: NEGATIVE for significant arthralgias or myalgia  NEURO: POSITIVE for paresthesias right upper extremity  ENDOCRINE: NEGATIVE for temperature intolerance, skin/hair changes  HEME: NEGATIVE for bleeding problems  PSYCHIATRIC: NEGATIVE for changes in mood or affect    Patient Active Problem List    Diagnosis Date Noted     Smoker 09/30/2021     Priority: Medium     Adjustment disorder with mixed anxiety and depressed mood 09/30/2021     Priority: Medium      No past medical history on file.  Past Surgical History:   Procedure Laterality Date     ANTERIOR CRUCIATE LIGAMENT REPAIR      4      MEDIAL COLLATERAL LIGAMENT AND LATERAL COLLATERAL LIGAMENT REPAIR, ELBOW      4      OTHER SURGICAL HISTORY      wrist reconstruction     Current Outpatient Medications   Medication Sig Dispense Refill     buPROPion (WELLBUTRIN XL) 150 MG 24 hr tablet Take 150 mg by mouth every morning (Patient not taking: Reported on 9/30/2021)       citalopram (CELEXA) 20 MG tablet Take 0.5 tablets (10 mg) by mouth daily for 7 days, THEN 0.5 tablets (10 mg) daily. 49 tablet 0      "gabapentin (NEURONTIN) 300 MG capsule Take 1 capsule (300 mg) by mouth 3 times daily as needed (facial pain) 90 capsule 0     valACYclovir (VALTREX) 1000 mg tablet Take 1 tablet (1,000 mg) by mouth 3 times daily for 7 days 21 tablet 0       No Known Allergies     Social History     Tobacco Use     Smoking status: Current Every Day Smoker     Packs/day: 1.00     Types: Cigarettes     Smokeless tobacco: Never Used   Substance Use Topics     Alcohol use: Yes     Comment: social     History reviewed. No pertinent family history.  History   Drug Use Unknown         Objective     /72 (BP Location: Right arm, Patient Position: Chair, Cuff Size: Adult Large)   Pulse 74   Temp 97.4  F (36.3  C) (Tympanic)   Resp 18   Ht 1.803 m (5' 11\")   Wt 102.1 kg (225 lb)   SpO2 100%   BMI 31.38 kg/m      Physical Exam    GENERAL APPEARANCE: healthy, alert and no distress     EYES: EOMI,  PERRL     HENT: ear canals and TM's normal and nose and mouth without ulcers or lesions     NECK: no adenopathy, no asymmetry, masses, or scars and thyroid normal to palpation     RESP: lungs clear to auscultation - no rales, rhonchi or wheezes     CV: regular rates and rhythm, normal S1 S2, no S3 or S4 and no murmur, click or rub     ABDOMEN:  soft, nontender, no HSM or masses and bowel sounds normal     MS: extremities normal- no gross deformities noted, no evidence of inflammation in joints, FROM in all extremities.     SKIN: no suspicious lesions or rashes     NEURO: Normal strength and tone, sensory exam grossly normal, mentation intact and speech normal     PSYCH: mentation appears normal. and affect normal/bright     LYMPHATICS: No cervical adenopathy    No results for input(s): HGB, PLT, INR, NA, POTASSIUM, CR, A1C in the last 56061 hours.     Diagnostics:  CBC RESULTS: Recent Labs   Lab Test 01/14/22  0831   WBC 6.4   RBC 5.47   HGB 16.5   HCT 48.2   MCV 88   MCH 30.2   MCHC 34.2   RDW 13.1        Last Comprehensive " Metabolic Panel:  Sodium   Date Value Ref Range Status   01/14/2022 141 133 - 144 mmol/L Final     Potassium   Date Value Ref Range Status   01/14/2022 5.5 (H) 3.4 - 5.3 mmol/L Final     Chloride   Date Value Ref Range Status   01/14/2022 108 94 - 109 mmol/L Final     Carbon Dioxide (CO2)   Date Value Ref Range Status   01/14/2022 29 20 - 32 mmol/L Final     Anion Gap   Date Value Ref Range Status   01/14/2022 4 3 - 14 mmol/L Final     Glucose   Date Value Ref Range Status   01/14/2022 120 (H) 70 - 99 mg/dL Final     Urea Nitrogen   Date Value Ref Range Status   01/14/2022 14 7 - 30 mg/dL Final     Creatinine   Date Value Ref Range Status   01/14/2022 1.10 0.66 - 1.25 mg/dL Final     GFR Estimate   Date Value Ref Range Status   01/14/2022 89 >60 mL/min/1.73m2 Final     Comment:     Effective December 21, 2021 eGFRcr in adults is calculated using the 2021 CKD-EPI creatinine equation which includes age and gender (Fela et al., NEJ, DOI: 10.1056/IBLPrp8291001)     Calcium   Date Value Ref Range Status   01/14/2022 9.3 8.5 - 10.1 mg/dL Final         No EKG required, no history of coronary heart disease, significant arrhythmia, peripheral arterial disease or other structural heart disease.    Revised Cardiac Risk Index (RCRI):  The patient has the following serious cardiovascular risks for perioperative complications:   - No serious cardiac risks = 0 points     RCRI Interpretation: 0 points: Class I (very low risk - 0.4% complication rate)           Signed Electronically by: JOCELYNE Jimenez CNP  Copy of this evaluation report is provided to requesting physician.

## 2022-01-14 NOTE — PATIENT INSTRUCTIONS
After your surgery, when you're no longer taking Tramadol you can restart Celexa. Start with 1/2 for 1-2 weeks, then increase to 1 tablet once tolerated     Schedule a virtual follow up with me 1 month after restarting Celexa. Or sooner as needed      Preparing for Your Surgery  Getting started  A nurse will call you to review your health history and instructions. They will give you an arrival time based on your scheduled surgery time. Please be ready to share:    Your doctor's clinic name and phone number    Your medical, surgical and anesthesia history    A list of allergies and sensitivities    A list of medicines, including herbal treatments and over-the-counter drugs    Whether the patient has a legal guardian (ask how to send us the papers in advance)  Please tell us if you're pregnant--or if there's any chance you might be pregnant. Some surgeries may injure a fetus (unborn baby), so they require a pregnancy test. Surgeries that are safe for a fetus don't always need a test, and you can choose whether to have one.   If you have a child who's having surgery, please ask for a copy of Preparing for Your Child's Surgery.    Preparing for surgery    Within 30 days of surgery: Have a pre-op exam (sometimes called an H&P, or History and Physical). This can be done at a clinic or pre-operative center.  ? If you're having a , you may not need this exam. Talk to your care team.    At your pre-op exam, talk to your care team about all medicines you take. If you need to stop any medicines before surgery, ask when to start taking them again.  ? We do this for your safety. Many medicines can make you bleed too much during surgery. Some change how well surgery (anesthesia) drugs work.    Call your insurance company to let them know you're having surgery. (If you don't have insurance, call [phone].)    Call your clinic if there's any change in your health. This includes signs of a cold or flu (sore throat,  runny nose, cough, rash, fever). It also includes a scrape or scratch near the surgery site.    If you have questions on the day of surgery, call your hospital or surgery center.  COVID testing  You may need to be tested for COVID-19 before having surgery. If so, we will give you instructions.  Eating and drinking guidelines  For your safety: Unless your surgeon tells you otherwise, follow the guidelines below.    Eat and drink as usual until 8 hours before surgery. After that, no food or milk.    Drink clear liquids until 2 hours before surgery. These are liquids you can see through, like water, Gatorade and Propel Water. You may also have black coffee and tea (no cream or milk).    Nothing by mouth within 2 hours of surgery. This includes gum, candy and breath mints.    If you drink alcohol: Stop drinking it the night before surgery.    If your care team tells you to take medicine on the morning of surgery, it's okay to take it with a sip of water.  Preventing infection    Shower or bathe the night before and morning of your surgery. Follow the instructions your clinic gave you. (If no instructions, use regular soap.)    Don't shave or clip hair near your surgery site. We'll remove the hair if needed.    Don't smoke or vape the morning of surgery. You may chew nicotine gum up to 2 hours before surgery. A nicotine patch is okay.  ? Note: Some surgeries require you to completely quit smoking and nicotine. Check with your surgeon.    Your care team will make every effort to keep you safe from infection. We will:  ? Clean our hands often with soap and water (or an alcohol-based hand rub).  ? Clean the skin at your surgery site with a special soap that kills germs.  ? Give you a special gown to keep you warm. (Cold raises the risk of infection.)  ? Wear special hair covers, masks, gowns and gloves during surgery.  ? Give antibiotic medicine, if prescribed. Not all surgeries need antibiotics.  What to bring on the day  of surgery    Photo ID and insurance card    Copy of your health care directive, if you have one    Glasses and hearing aides (bring cases)  ? You can't wear contacts during surgery    Inhaler and eye drops, if you use them (tell us about these when you arrive)    CPAP machine or breathing device, if you use them    A few personal items, if spending the night    If you have . . .  ? A pacemaker, ICD (cardiac defibrillator) or other implant: Bring the ID card.  ? An implanted stimulator: Bring the remote control.  ? A legal guardian: Bring a copy of the certified (court-stamped) guardianship papers.  Please remove any jewelry, including body piercings. Leave jewelry and other valuables at home.  If you're going home the day of surgery    You must have a responsible adult drive you home. They should stay with you overnight as well.    If you don't have someone to stay with you, and you aren't safe to go home alone, we may keep you overnight. Insurance often won't pay for this.  After surgery  If it's hard to control your pain or you need more pain medicine, please call your surgeon's office.  Questions?   If you have any questions for your care team, list them here: _________________________________________________________________________________________________________________________________________________________________________ ____________________________________ ____________________________________ ____________________________________  For informational purposes only. Not to replace the advice of your health care provider. Copyright   2003, 2019 [facility]. All rights reserved. Clinically reviewed by Sunitha Camacho MD. nprogress [id number] - REV 07/21.

## 2022-01-14 NOTE — PROGRESS NOTES
Preop faxed again to [facility].  Desiree Adams CMA    Answers for HPI/ROS submitted by the patient on 1/14/2022  If you checked off any problems, how difficult have these problems made it for you to do your work, take care of things at home, or get along with other people?: Somewhat difficult  PHQ9 TOTAL SCORE: 8  ANOOP 7 TOTAL SCORE: 13

## 2022-01-15 ASSESSMENT — PATIENT HEALTH QUESTIONNAIRE - PHQ9: SUM OF ALL RESPONSES TO PHQ QUESTIONS 1-9: 8

## 2022-01-15 ASSESSMENT — ANXIETY QUESTIONNAIRES: GAD7 TOTAL SCORE: 13

## 2023-01-30 ENCOUNTER — HEALTH MAINTENANCE LETTER (OUTPATIENT)
Age: 38
End: 2023-01-30

## 2023-02-27 ASSESSMENT — ANXIETY QUESTIONNAIRES
4. TROUBLE RELAXING: NEARLY EVERY DAY
5. BEING SO RESTLESS THAT IT IS HARD TO SIT STILL: NEARLY EVERY DAY
7. FEELING AFRAID AS IF SOMETHING AWFUL MIGHT HAPPEN: NEARLY EVERY DAY
1. FEELING NERVOUS, ANXIOUS, OR ON EDGE: NEARLY EVERY DAY
IF YOU CHECKED OFF ANY PROBLEMS ON THIS QUESTIONNAIRE, HOW DIFFICULT HAVE THESE PROBLEMS MADE IT FOR YOU TO DO YOUR WORK, TAKE CARE OF THINGS AT HOME, OR GET ALONG WITH OTHER PEOPLE: EXTREMELY DIFFICULT
8. IF YOU CHECKED OFF ANY PROBLEMS, HOW DIFFICULT HAVE THESE MADE IT FOR YOU TO DO YOUR WORK, TAKE CARE OF THINGS AT HOME, OR GET ALONG WITH OTHER PEOPLE?: EXTREMELY DIFFICULT
GAD7 TOTAL SCORE: 21
GAD7 TOTAL SCORE: 21
3. WORRYING TOO MUCH ABOUT DIFFERENT THINGS: NEARLY EVERY DAY
GAD7 TOTAL SCORE: 21
2. NOT BEING ABLE TO STOP OR CONTROL WORRYING: NEARLY EVERY DAY
6. BECOMING EASILY ANNOYED OR IRRITABLE: NEARLY EVERY DAY
7. FEELING AFRAID AS IF SOMETHING AWFUL MIGHT HAPPEN: NEARLY EVERY DAY

## 2023-02-27 ASSESSMENT — PATIENT HEALTH QUESTIONNAIRE - PHQ9
10. IF YOU CHECKED OFF ANY PROBLEMS, HOW DIFFICULT HAVE THESE PROBLEMS MADE IT FOR YOU TO DO YOUR WORK, TAKE CARE OF THINGS AT HOME, OR GET ALONG WITH OTHER PEOPLE: EXTREMELY DIFFICULT
SUM OF ALL RESPONSES TO PHQ QUESTIONS 1-9: 21
SUM OF ALL RESPONSES TO PHQ QUESTIONS 1-9: 21

## 2023-02-28 ENCOUNTER — OFFICE VISIT (OUTPATIENT)
Dept: INTERNAL MEDICINE | Facility: CLINIC | Age: 38
End: 2023-02-28
Payer: COMMERCIAL

## 2023-02-28 VITALS
WEIGHT: 245.9 LBS | DIASTOLIC BLOOD PRESSURE: 74 MMHG | RESPIRATION RATE: 18 BRPM | SYSTOLIC BLOOD PRESSURE: 136 MMHG | BODY MASS INDEX: 34.43 KG/M2 | HEIGHT: 71 IN | OXYGEN SATURATION: 97 % | HEART RATE: 80 BPM

## 2023-02-28 DIAGNOSIS — F41.9 ANXIETY: Primary | ICD-10-CM

## 2023-02-28 DIAGNOSIS — F33.1 MODERATE EPISODE OF RECURRENT MAJOR DEPRESSIVE DISORDER (H): ICD-10-CM

## 2023-02-28 PROCEDURE — 96127 BRIEF EMOTIONAL/BEHAV ASSMT: CPT | Performed by: PHYSICIAN ASSISTANT

## 2023-02-28 PROCEDURE — 99214 OFFICE O/P EST MOD 30 MIN: CPT | Performed by: PHYSICIAN ASSISTANT

## 2023-02-28 RX ORDER — VENLAFAXINE HYDROCHLORIDE 37.5 MG/1
37.5 CAPSULE, EXTENDED RELEASE ORAL DAILY
Qty: 60 CAPSULE | Refills: 1 | Status: SHIPPED | OUTPATIENT
Start: 2023-02-28 | End: 2023-03-27

## 2023-02-28 ASSESSMENT — ANXIETY QUESTIONNAIRES: GAD7 TOTAL SCORE: 21

## 2023-02-28 ASSESSMENT — PATIENT HEALTH QUESTIONNAIRE - PHQ9
10. IF YOU CHECKED OFF ANY PROBLEMS, HOW DIFFICULT HAVE THESE PROBLEMS MADE IT FOR YOU TO DO YOUR WORK, TAKE CARE OF THINGS AT HOME, OR GET ALONG WITH OTHER PEOPLE: EXTREMELY DIFFICULT
SUM OF ALL RESPONSES TO PHQ QUESTIONS 1-9: 21

## 2023-02-28 NOTE — PROGRESS NOTES
"  Assessment & Plan     Anxiety    - venlafaxine (EFFEXOR XR) 37.5 MG 24 hr capsule; Take 1 capsule (37.5 mg) by mouth daily Increase to 2 caps daily after one week    Moderate episode of recurrent major depressive disorder (H)    - venlafaxine (EFFEXOR XR) 37.5 MG 24 hr capsule; Take 1 capsule (37.5 mg) by mouth daily Increase to 2 caps daily after one week             Nicotine/Tobacco Cessation:  He reports that he has been smoking cigarettes. He has been smoking an average of 1 pack per day. He has never used smokeless tobacco.  Nicotine/Tobacco Cessation Plan:   per PCP      BMI:   Estimated body mass index is 34.3 kg/m  as calculated from the following:    Height as of this encounter: 1.803 m (5' 11\").    Weight as of this encounter: 111.5 kg (245 lb 14.4 oz).   Weight management plan: Patient was referred to their PCP to discuss a diet and exercise plan.    Depression Screening Follow Up    PHQ 2/27/2023   PHQ-9 Total Score 21   Q9: Thoughts of better off dead/self-harm past 2 weeks Not at all     Declines counseling  Reviewed start of medication and follow up time     Follow Up Actions Taken  Crisis resource information provided in After Visit Summary         Return in about 3 years (around 2/28/2026) for regular primary provider, video visit.    Ashley Lima PA-C  [facility]    Alison Hassan is a 37 year old, presenting for the following health issues:  Depression and Anxiety      History of Present Illness       Mental Health Follow-up:  Patient presents to follow-up on Depression & Anxiety.Patient's depression since last visit has been:  Worse  The patient is having other symptoms associated with depression.  Patient's anxiety since last visit has been:  Worse  The patient is having other symptoms associated with anxiety.  Any significant life events: relationship concerns, job concerns, financial concerns, housing concerns, grief or loss and other  Patient is " "feeling anxious or having panic attacks.  Patient has concerns about alcohol or drug use.    He eats 0-1 servings of fruits and vegetables daily.He consumes 0 sweetened beverage(s) daily.He exercises with enough effort to increase his heart rate 10 to 19 minutes per day.  He exercises with enough effort to increase his heart rate 3 or less days per week.   He is taking medications regularly.    Today's PHQ-9         PHQ-9 Total Score: 21    PHQ-9 Q9 Thoughts of better off dead/self-harm past 2 weeks :   Not at all    How difficult have these problems made it for you to do your work, take care of things at home, or get along with other people: Extremely difficult  Today's ANOOP-7 Score: 21             Review of Systems         Objective    /74   Pulse 80   Resp 18   Ht 1.803 m (5' 11\")   Wt 111.5 kg (245 lb 14.4 oz)   SpO2 97%   BMI 34.30 kg/m    Body mass index is 34.3 kg/m .  Physical Exam   GENERAL: healthy, alert and no distress  MS: no gross musculoskeletal defects noted, no edema  NEURO: Normal strength and tone, mentation intact and speech normal  PSYCH: mentation appears normal and anxious                    "

## 2023-03-27 ENCOUNTER — VIRTUAL VISIT (OUTPATIENT)
Dept: FAMILY MEDICINE | Facility: CLINIC | Age: 38
End: 2023-03-27
Payer: COMMERCIAL

## 2023-03-27 DIAGNOSIS — F43.23 ADJUSTMENT DISORDER WITH MIXED ANXIETY AND DEPRESSED MOOD: Primary | ICD-10-CM

## 2023-03-27 PROCEDURE — 99214 OFFICE O/P EST MOD 30 MIN: CPT | Mod: VID | Performed by: FAMILY MEDICINE

## 2023-03-27 RX ORDER — ESCITALOPRAM OXALATE 10 MG/1
10 TABLET ORAL DAILY
Qty: 30 TABLET | Refills: 0 | Status: SHIPPED | OUTPATIENT
Start: 2023-03-27

## 2023-03-27 ASSESSMENT — ANXIETY QUESTIONNAIRES
GAD7 TOTAL SCORE: 18
8. IF YOU CHECKED OFF ANY PROBLEMS, HOW DIFFICULT HAVE THESE MADE IT FOR YOU TO DO YOUR WORK, TAKE CARE OF THINGS AT HOME, OR GET ALONG WITH OTHER PEOPLE?: EXTREMELY DIFFICULT
7. FEELING AFRAID AS IF SOMETHING AWFUL MIGHT HAPPEN: MORE THAN HALF THE DAYS
6. BECOMING EASILY ANNOYED OR IRRITABLE: NEARLY EVERY DAY
5. BEING SO RESTLESS THAT IT IS HARD TO SIT STILL: SEVERAL DAYS
GAD7 TOTAL SCORE: 18
3. WORRYING TOO MUCH ABOUT DIFFERENT THINGS: NEARLY EVERY DAY
1. FEELING NERVOUS, ANXIOUS, OR ON EDGE: NEARLY EVERY DAY
GAD7 TOTAL SCORE: 18
IF YOU CHECKED OFF ANY PROBLEMS ON THIS QUESTIONNAIRE, HOW DIFFICULT HAVE THESE PROBLEMS MADE IT FOR YOU TO DO YOUR WORK, TAKE CARE OF THINGS AT HOME, OR GET ALONG WITH OTHER PEOPLE: EXTREMELY DIFFICULT
7. FEELING AFRAID AS IF SOMETHING AWFUL MIGHT HAPPEN: MORE THAN HALF THE DAYS
4. TROUBLE RELAXING: NEARLY EVERY DAY
2. NOT BEING ABLE TO STOP OR CONTROL WORRYING: NEARLY EVERY DAY

## 2023-03-27 ASSESSMENT — PATIENT HEALTH QUESTIONNAIRE - PHQ9
SUM OF ALL RESPONSES TO PHQ QUESTIONS 1-9: 18
SUM OF ALL RESPONSES TO PHQ QUESTIONS 1-9: 18
10. IF YOU CHECKED OFF ANY PROBLEMS, HOW DIFFICULT HAVE THESE PROBLEMS MADE IT FOR YOU TO DO YOUR WORK, TAKE CARE OF THINGS AT HOME, OR GET ALONG WITH OTHER PEOPLE: EXTREMELY DIFFICULT

## 2023-03-27 NOTE — PROGRESS NOTES
Mo is a 37 year old who is being evaluated via a billable video visit.      How would you like to obtain your AVS? MyChart  If the video visit is dropped, the invitation should be resent by: Text to cell phone: [phone]  Will anyone else be joining your video visit? No        Assessment & Plan     Adjustment disorder with mixed anxiety and depressed mood  Trial of lexapro  - escitalopram (LEXAPRO) 10 MG tablet; Take 1 tablet (10 mg) by mouth daily             Depression Screening Follow Up    PHQ 3/27/2023   PHQ-9 Total Score 18   Q9: Thoughts of better off dead/self-harm past 2 weeks Not at all       Follow Up Actions Taken  Patient declined referral.         Kellen Gutierrez MD  [facility]    Alison Hassan is a 37 year old, presenting for the following health issues:  MH Follow Up  No flowsheet data found.  History of Present Illness       Mental Health Follow-up:  Patient presents to follow-up on Depression & Anxiety.Patient's depression since last visit has been:  No change  The patient is having other symptoms associated with depression.  Patient's anxiety since last visit has been:  No change  The patient is having other symptoms associated with anxiety.  Any significant life events: relationship concerns, job concerns, financial concerns and grief or loss  Patient is feeling anxious or having panic attacks.  Patient has concerns about alcohol or drug use.    He eats 0-1 servings of fruits and vegetables daily.He consumes 0 sweetened beverage(s) daily.He exercises with enough effort to increase his heart rate 9 or less minutes per day.  He exercises with enough effort to increase his heart rate 3 or less days per week.   He is taking medications regularly.    Today's PHQ-9         PHQ-9 Total Score: 18    PHQ-9 Q9 Thoughts of better off dead/self-harm past 2 weeks :   Not at all    How difficult have these problems made it for you to do your work, take care of  things at home, or get along with other people: Extremely difficult  Today's ANOOP-7 Score: 18     Failed celexa and then moved to Effexor but had side effects like ears ringing, hot flashes and sensation like hair standing up on 37.5 mg. Working to treat irritability and motivation. He runs a [employer].  Got  in 2019 and three children who live 300 miles away. Used Wellbutrin in the past to help quit smoking but it didn't work      Review of Systems   Constitutional, HEENT, cardiovascular, pulmonary, gi and gu systems are negative, except as otherwise noted.      Objective           Vitals:  No vitals were obtained today due to virtual visit.    Physical Exam   GENERAL: Healthy, alert and no distress  EYES: Eyes grossly normal to inspection.  No discharge or erythema, or obvious scleral/conjunctival abnormalities.  RESP: No audible wheeze, cough, or visible cyanosis.  No visible retractions or increased work of breathing.    SKIN: Visible skin clear. No significant rash, abnormal pigmentation or lesions.  NEURO: Cranial nerves grossly intact.  Mentation and speech appropriate for age.  PSYCH: Mentation appears normal, affect normal/bright, judgement and insight intact, normal speech and appearance well-groomed.          Video-Visit Details    Type of service:  Video Visit     Originating Location (pt. Location): Home  Distant Location (provider location):  On-site  Platform used for Video Visit: [employer]

## 2023-06-13 ENCOUNTER — HOSPITAL ENCOUNTER (EMERGENCY)
Facility: CLINIC | Age: 38
Discharge: HOME | End: 2023-06-13
Attending: EMERGENCY MEDICINE | Admitting: EMERGENCY MEDICINE
Payer: COMMERCIAL

## 2023-06-13 VITALS
HEART RATE: 106 BPM | SYSTOLIC BLOOD PRESSURE: 147 MMHG | BODY MASS INDEX: 30.8 KG/M2 | WEIGHT: 220 LBS | OXYGEN SATURATION: 97 % | TEMPERATURE: 97 F | HEIGHT: 71 IN | DIASTOLIC BLOOD PRESSURE: 102 MMHG | RESPIRATION RATE: 16 BRPM

## 2023-06-13 DIAGNOSIS — K64.5 THROMBOSED EXTERNAL HEMORRHOIDS: ICD-10-CM

## 2023-06-13 PROCEDURE — 99282 EMERGENCY DEPT VISIT SF MDM: CPT

## 2023-06-13 ASSESSMENT — ENCOUNTER SYMPTOMS: ANAL BLEEDING: 1

## 2023-06-13 NOTE — DISCHARGE INSTRUCTIONS
Dietary recommendations as discussed: Encourage fluids, bran, fiber, fruits and vegetables.  MiraLAX over-the-counter once daily with 8 ounces of water or juice to keep bowel movements loose.  Diaper wipes after each bowel movement for cleansing purposes.  Preparation or Anusol after each bowel movement.  Over-the-counter Tylenol or ibuprofen every 6 hours can help with pain.  Referral to colorectal surgery.  Call them this week and make an appointment.--See the card we gave you.

## 2023-06-13 NOTE — ED PROVIDER NOTES
EMERGENCY DEPARTMENT ENCOUNTER      NAME: Mo Armstrong  AGE: 37 year old male  YOB: 1985  MRN: [id number]  EVALUATION DATE & TIME: No admission date for patient encounter.    PCP: Huong Duran    ED PROVIDER: Deshawn Dangelo M.D.      Chief Complaint   Patient presents with     Hemorrhoids         FINAL IMPRESSION:  1.  Acute thrombosed rectal hemorrhoids.      ED COURSE & MEDICAL DECISION MAKIN:07 PM I met with the patient to gather history and to perform my initial exam. We discussed plans for the ED course, including diagnostic testing and treatment. PPE worn: cloth mask.  Patient has problems with hemorrhoids off and on for least 5 years.  They have been acting up now over the last 2 weeks with increased pain and bleeding.  Patient has not seen anybody for this.  Patient will be discharged home.  We did talk about dietary recommendations to keep bowel movements loose such as increased fluids, bran, fiber, fruits and vegetables.  MiraLAX daily with 8 ounces of water or juice to stay loose.  Diaper wipes after bowel movements and Anusol or Preparation H after each cleansing.  Patient given referral to colorectal surgery.  Patient in agreement with the plan.  Tylenol or ibuprofen as needed for pain.      Pertinent Labs & Imaging studies reviewed. (See chart for details)      37 year old male presents to the Emergency Department for evaluation of hemorrhoids.    At the conclusion of the encounter I discussed the results of all of the tests and the disposition. The questions were answered. The patient or family acknowledged understanding and was agreeable with the care plan.       Medical Decision Making    History:    Supplemental history from: Documented in chart, if applicable    External Record(s) reviewed: Both inpatient and outpatient computer records were reviewed.     Work Up:    Chart documentation includes differential considered and any EKGs or imaging independently  interpreted by provider, where specified.  Differential diagnosis includes rectal Mora, rectal fissure, hemorrhoids, etc.    In additional to work up documented, I considered the following work up: Documented in chart, if applicable.    External consultation:    Discussion of management with another provider: Documented in chart, if applicable    Complicating factors:    Care impacted by chronic illness: Mental Health and Smoking / Nicotine Use    Care affected by social determinants of health: N/A    Disposition considerations: Patient discharged home with instructions for Preparation H or Anusol, Tylenol or ibuprofen.      MEDICATIONS GIVEN IN THE EMERGENCY:  Medications - No data to display    NEW PRESCRIPTIONS STARTED AT TODAY'S ER VISIT  New Prescriptions    No medications on file          =================================================================    HPI    Patient information was obtained from: Patient    Use of : N/A         Mo CON Armstrong is a 37 year old male with a pertinent history of smoker, adjustment disorder with mixed anxiety and depression, and hemorrhoids, who presents to this ED via self walk-in for evaluation of anal bleeding and anal pain.    Patient reports he has been dealing with hemorrhoids for the past 5 year intermittently. Initially, he was seen by [facility] who did a colonoscopy and recommended he get bands for his hemorrhoids. However, he did not follow through at the time. He now endorses worsening anal pain and bleeding over the past 2 weeks and decided to come into ED to be evaluated.    He does not identify any waxing or waning symptoms otherwise, exacerbating or alleviating features,associated symptoms except as mentioned. He denies any pain related complaints.    REVIEW OF SYSTEMS   Review of Systems   Gastrointestinal: Positive for anal bleeding.        Endorses anal pain.        PAST MEDICAL HISTORY:  History reviewed. No pertinent past medical history.    PAST  "SURGICAL HISTORY:  Past Surgical History:   Procedure Laterality Date     ANTERIOR CRUCIATE LIGAMENT REPAIR      4      MEDIAL COLLATERAL LIGAMENT AND LATERAL COLLATERAL LIGAMENT REPAIR, ELBOW      4      OTHER SURGICAL HISTORY      wrist reconstruction           CURRENT MEDICATIONS:    escitalopram (LEXAPRO) 10 MG tablet        ALLERGIES:  No Known Allergies    FAMILY HISTORY:  History reviewed. No pertinent family history.    SOCIAL HISTORY:   Social History     Socioeconomic History     Marital status:      Spouse name: None     Number of children: None     Years of education: None     Highest education level: None   Tobacco Use     Smoking status: Every Day     Packs/day: 1.00     Types: Cigarettes     Smokeless tobacco: Never   Vaping Use     Vaping status: Never Used   Substance and Sexual Activity     Alcohol use: Yes     Comment: social     Drug use: Never     Sexual activity: Yes     Partners: Female   Daily smoker.  Occasional alcohol.  No drugs.    VITALS:  BP (!) 147/102   Pulse 106   Temp 97  F (36.1  C) (Temporal)   Resp 16   Ht 1.803 m (5' 11\")   Wt 99.8 kg (220 lb)   SpO2 97%   BMI 30.68 kg/m      PHYSICAL EXAM    Vital Signs:  BP (!) 147/102   Pulse 106   Temp 97  F (36.1  C) (Temporal)   Resp 16   Ht 1.803 m (5' 11\")   Wt 99.8 kg (220 lb)   SpO2 97%   BMI 30.68 kg/m    General:  On entering the room he is in no apparent distress.    Neck:  Neck supple with full range of motion and nontender.    Back:  Back and spine are nontender.  No costovertebral angle tenderness.    HEENT:  Oropharynx clear with moist mucous membranes.  HEENT unremarkable.    Pulmonary:  Chest clear to auscultation without rhonchi rales or wheezing.    Cardiovascular:  Cardiac regular rate and rhythm without murmurs rubs or gallops.    Abdomen:  Abdomen soft nontender.  There is no rebound or guarding.  With the patient in the prone position, patient has 2 small hemorrhoids at the 2:00 and 10 o'clock " position that are not inflamed.  There is an inflamed hemorrhoid at the 6 o'clock position that is approximately pea-sized.  Muskuloskeletal:  he moves all 4 without any difficulty and has normal neurovascular exams.  Extremities without clubbing, cyanosis, or edema.  Legs and calves are nontender.    Neuro:  he is alert and oriented ×3 and moves all extremities symmetrically.    Psych:  Normal affect.    Skin:  Unremarkable and warm and dry.       LAB:  All pertinent labs reviewed and interpreted.  Labs Ordered and Resulted from Time of ED Arrival to Time of ED Departure - No data to display    RADIOLOGY:  Reviewed all pertinent imaging. Please see official radiology report.  No orders to display              EKG:          PROCEDURES:   None      I, Sofi Bentiez, am serving as a scribe to document services personally performed by Dr. Dangelo based on my observation and the provider's statements to me. I, Deshawn Dangelo MD attest that Sofi Benitez is acting in a scribe capacity, has observed my performance of the services and has documented them in accordance with my direction.    Deshawn Dangelo M.D.  Emergency Medicine  [facility] EMERGENCY ROOM  [address] [id number] [phone]  Dept: [phone]     Deshawn Dangelo MD  06/13/23 3996

## 2023-06-13 NOTE — ED TRIAGE NOTES
Pt reports worsening of symptoms with hemorrhoids he's dealt with for over a year. Having increased pain and bleeding over the past two weeks.     Triage Assessment     Row Name 06/13/23 7640       Triage Assessment (Adult)    Airway WDL WDL       Respiratory WDL    Respiratory WDL WDL       Skin Circulation/Temperature WDL    Skin Circulation/Temperature WDL WDL       Cardiac WDL    Cardiac WDL WDL       Peripheral/Neurovascular WDL    Peripheral Neurovascular WDL WDL       Cognitive/Neuro/Behavioral WDL    Cognitive/Neuro/Behavioral WDL WDL

## 2023-10-06 ENCOUNTER — TELEPHONE (OUTPATIENT)
Dept: BEHAVIORAL HEALTH | Facility: CLINIC | Age: 38
End: 2023-10-06
Payer: COMMERCIAL

## 2023-10-06 NOTE — TELEPHONE ENCOUNTER
Pt is a(n) adult (18+ out of HS) Seeking as eval for Adult Dual Assessment for evaluation of both MH & JOYCELYN to determine primary treatment options..  Appointment scheduled by:  Patient.  (self-pay - complete Cost Estimate)  Caller name:  Mo Armstrong    Caller phone #: [phone]       needed?  NO    Contact information verified/updated: Yes    Ewa Hanson

## 2023-10-16 ENCOUNTER — HOSPITAL ENCOUNTER (OUTPATIENT)
Dept: BEHAVIORAL HEALTH | Facility: CLINIC | Age: 38
Discharge: HOME | End: 2023-10-16
Attending: PSYCHIATRY & NEUROLOGY | Admitting: PSYCHIATRY & NEUROLOGY
Payer: COMMERCIAL

## 2023-10-16 PROCEDURE — 90791 PSYCH DIAGNOSTIC EVALUATION: CPT | Performed by: COUNSELOR

## 2023-10-16 ASSESSMENT — ANXIETY QUESTIONNAIRES
2. NOT BEING ABLE TO STOP OR CONTROL WORRYING: MORE THAN HALF THE DAYS
GAD7 TOTAL SCORE: 18
GAD7 TOTAL SCORE: 18
3. WORRYING TOO MUCH ABOUT DIFFERENT THINGS: NEARLY EVERY DAY
1. FEELING NERVOUS, ANXIOUS, OR ON EDGE: NEARLY EVERY DAY
5. BEING SO RESTLESS THAT IT IS HARD TO SIT STILL: MORE THAN HALF THE DAYS
6. BECOMING EASILY ANNOYED OR IRRITABLE: NEARLY EVERY DAY
IF YOU CHECKED OFF ANY PROBLEMS ON THIS QUESTIONNAIRE, HOW DIFFICULT HAVE THESE PROBLEMS MADE IT FOR YOU TO DO YOUR WORK, TAKE CARE OF THINGS AT HOME, OR GET ALONG WITH OTHER PEOPLE: EXTREMELY DIFFICULT
7. FEELING AFRAID AS IF SOMETHING AWFUL MIGHT HAPPEN: NEARLY EVERY DAY

## 2023-10-16 ASSESSMENT — COLUMBIA-SUICIDE SEVERITY RATING SCALE - C-SSRS
2. HAVE YOU ACTUALLY HAD ANY THOUGHTS OF KILLING YOURSELF?: NO
1. HAVE YOU WISHED YOU WERE DEAD OR WISHED YOU COULD GO TO SLEEP AND NOT WAKE UP?: NO
TOTAL  NUMBER OF INTERRUPTED ATTEMPTS LIFETIME: NO
6. HAVE YOU EVER DONE ANYTHING, STARTED TO DO ANYTHING, OR PREPARED TO DO ANYTHING TO END YOUR LIFE?: NO
ATTEMPT LIFETIME: NO
TOTAL  NUMBER OF ABORTED OR SELF INTERRUPTED ATTEMPTS LIFETIME: NO

## 2023-10-16 ASSESSMENT — PATIENT HEALTH QUESTIONNAIRE - PHQ9
5. POOR APPETITE OR OVEREATING: MORE THAN HALF THE DAYS
SUM OF ALL RESPONSES TO PHQ QUESTIONS 1-9: 20

## 2023-10-16 NOTE — PATIENT INSTRUCTIONS
Mo,  It was a pleasure meeting with you. I put in a referral order for [facility] behavioral intake team to call you to offer appointment options for individual therapy and psychiatry. If you would like to call them directly to schedule they should be able to reference my order and their number is [phone].I also put the psychiatry department's contact below. They should be contacting you and if you do not hear from them or if you have any questions or want resources, please let our navigators know. Their contact is below. I also put some community therapist (talk therapist) who specialize in mental health and substance use below.    The Patient Navigator Coordination Team  A Patient Navigation Coordinator will contact you within 48 hours to complete your admission or referral. Please contact them directly if you have any questions.  Phone: [phone]  Fax: [phone]  E-mail: [email or web address], [address] [address]  Phone:  [phone]    INDIVIDUAL THERAPIST FOR DUAL (Mental health and substance use)  Care Counseling  Providers: Roselia Wang MS, Formerly named [facility], [facility]  MALICK Carr, SW  And others  Website:[email or web address]  Telephone: [phone]    [facility]  Providers: MALICK Godinez, [facility], Formerly named [facility]   Dania Bhat [facility]  Website:[email or web address]  [phone]) [phone]    [facility] [place]  Provider: Briana Perez MA, LMFT  Website:[email or web address]  Telephone: [phone]     [facility]  Provider: Albania Blanchard MA,  [facility]  Website:[email or web address]  [phone]) [phone]     [facility]  Website:[email or web address]  Telephone:[phone]  Providers:  Glenys Reyes MA, [facility]  Celestina Dwyer, [facility]  Christine Tovar MA, [facility]  Aracelis Tamayo PsyD,   DAKSHA Galvez, [facility]  Quincy Gracia MA, [facility], [facility]  Provider: Chris Basurto LP, [facility]  Website: [email or web address]  [phone]) [phone]    Mary Do, [facility], [facility] Licensed Mental Health Professional [employer] Psychotherapist   Triage and Transition: Assessment Center  [facility]  Gender Pronouns: she/her

## 2023-10-16 NOTE — PROGRESS NOTES
"[facility]      PATIENT'S NAME: Mo Armstrong  PREFERRED NAME: Mo  PRONOUNS: He/him  MRN: [id number]  : 1985  ADDRESS: [address] [place] [id number]  ACCT. NUMBER:  [id number]  DATE OF SERVICE: 10/16/23  START TIME: 8:10 am  END TIME: 9:31 am  PREFERRED PHONE: [phone]  May we leave a program related message: Yes  SERVICE MODALITY:  In-person    UNIVERSAL ADULT Mental Health DIAGNOSTIC ASSESSMENT    Identifying Information:  Patient is a 38 year old,  individual.  Patient was referred for an assessment by self.  Patient attended the session alone.    Chief Complaint:   The reason for seeking services at this time is:\"got given an ultimatim\". Patient reports he has been substituting lack of feeling good about things with gambling and drinking. He reports he has not drank or gone to casino in 2-3 weeks. Patient reports it is not all the time thing as he knows he cannot. Patient reports he understands the impact drinking has on day to day but when he leaves work he blacks out and puts self in situation. 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 Patient reports from what he used to do to now he got to where he is from hard work and dedication but he is not who he was then. Patient reports he feels he is about to lose handle of it. Patient reports he has been in the clinic three to four times and been on different medication and feels it immobilized him where he just wanted to stick on the couch so it was not helpful. Patient reports he was on Celexa and that worked great when he was younger and then got off in his 20's. Patient reports he got  five years ago and thinks this is where a lot of it stems from and he had three kids and [facility] let her move six hours away with them. Patient reports he tried to be closer to them and work down " "here. Patient reports she got  and moved to [place] so he moved again and moved up there and is commuting from there to here. Patient reports child support and debt he has accrued he does not have ways to get an apartment done here but has places to stay. Patient reports he has a plan to get the debt taking care of by March. Patient reports he is concerned and if he does not get things figured out, he knows this is a re-occurring thing, and then he starts spending money at the casino again Patient reports he feels like he is trying to fill a void because that and drinking is only thing that makes him feel good besides his kids. Patient reports he is on the verge of leaving his GF. Patient reports he does not want to talk and wants to hide. Patient reports if he does go out will he black out and then wake up more hung over and more in debt. Patient reports this would happen once every month or two months. Last time it was happening once a week and progressively it is getting worse.  Patient has attempted to resolve these concerns in the past through therapy and medications .  Patient does not appear to be in severe withdrawal, an imminent safety risk to self or others, or requiring immediate medical attention and may proceed with the assessment interview.    Social/Family History:  Patient reported they grew up in [place], [place]. Originally from [place]. Father moved up here in 1993 for his job and they all moved up here. They were raised by biological parents. Patient reports having one younger sibling. Patient reported that their childhood was pretty good, concussions through hockey and was told he could not play contact sports freshman year and then \"everything went to shit after that\". Patient reports he tore his ACL and had multiple surgeries since for that. Patient reports he had his fourth surgery recently.   Patient describes current relationships with family of origin as everything is fine but " everyone is worried so they are on him.      The patient describes their cultural background as none.  Cultural influences and impact on patient's life structure, values, norms, and healthcare: none identified.  Contextual influences on patient's health include: occupational stressors, co-parenting, relationship strain, substance use, financial concerns.  Patient identified their preferred language to be English. Patient reported they do not need the assistance of an  or other support involved in therapy.  Patient reports they are not involved in community of [place] activities.  They reports spirituality impacts recovery in the following ways:  none.     Patient reported had no significant delays in developmental tasks.  Patient's highest education level was associate degree / vocational certificate. Patient identified the following learning problems: none reported.  Patient reports they are  able to understand written materials.    Patient reported the following relationship history as previously  in 2019.  Patient's current relationship status is partnered / significant other for 4 years.   Patient identified their sexual orientation as heterosexual.  Patient reported having three child(sam). Patient reports he sees his children every other weekend, legally. Patient reports he never says no to having them, especially during hockey season.     Patient's current living/housing situation involves staying in own home/apartment and they report that housing is stable. Patient identified mother as part of their support system.  Patient identified the quality of these relationships as fair. Patient reports he tries to keep reaching out to his mother to a minimum as she is very worrisome and naive.   Patient reports everyone has been reliant on him. Socially he does not have friends besides the guys that work for him. Patient reports his GF is someone he does not feel that he can go to. Patient reports he  was  for 10 years to someone who was controlling who forced him to come home right after work. Patient reports the freedom after the divorce and the non-self control, like he should have got but he never gained in his 20's, did not help, and now he has some one in his life that is great but it would kill her to tell him no.     Patient reports engaging in the following recreational/leisure activities: sports and being with his children. Patient reports engaging in the following recreation/leisure activities while using: casino, socializing. Patient reports the following people are supportive of recovery: GF and his family.  Patient is currently employed full time and reports they are able to function appropriately at work.  Patient reports their income is obtained through employment.  Patient does identify finances as a current stressor.  Cultural and socioeconomic factors do affect the patient's access to services.    Patient reports the following substance related arrests or legal issues: DUI when age 20.  Patient denies being on probation / parole / under the jurisdiction of the court.    Patient's Strengths and Limitations:  Patient identified the following strengths or resources that will help them succeed in treatment: motivation and work ethic. Things that may interfere with the patient's success in treatment include: financial hardship and lack of social support.     Personal and Family Medical History:  Patient did report a family history of mental health concerns.  Patient reports depression on maternal side.     Patient reported the following previous diagnoses which include(s): Adjustment Disorder with mixed anxiety and depressed mood, Major Depressive Disorder, Moderate, Anxiety.   Patient reports symptoms do impact ability to function.   Patient has received mental health services in the past: medication and previous therapy.  Patient reports shortly after the divorce in 2019 he tried therapy.  Patient reports he felt no guidance from therapist and it was just was to debrief and he could do that with any body and not have to pay them.  Patient reports he did a couple sessions too when concussions where going on. Patient reports he could use his HSA or insurance at this point. Psychiatric Hospitalizations: None.  Patient denies a history of civil commitment. Currently, patient is not receiving other mental health services.  These include none.     Patient has had a physical exam to rule out medical causes for current symptoms.  Date of last physical exam was within the past year. Client was encouraged to follow up with PCP if symptoms were to develop. The patient has a [facility] Provider, who is named Kellen Trotter MD.. Patient reports the following current medical concerns: see below . Patient denies any issues with pain..  Patient denies pregnancy. .  There are not significant appetite / nutritional concerns / weight changes.   Patient does report a history of head injury / trauma / cognitive impairment.      Patient reports current meds as:   Current Outpatient Medications   Medication Sig    escitalopram (LEXAPRO) 10 MG tablet Take 1 tablet (10 mg) by mouth daily     No current facility-administered medications for this encounter.    Patient reports he felt he could not get off the couch when on this medication and it was terrible side effect.     Medication Adherence:  Patient is not taking any prescribed medication currently.     Patient Allergies:  No Known Allergies    Medical History:    Spinal Stenosis  Radiculopathy  Unilateral Primary Osteoarthritis, right knee  Shingles  Anxiety  MDD      Current Mental Status Exam:   Appearance:  Appropriate    Eye Contact:  Good   Psychomotor:  Normal       Gait / station:  no problem  Attitude / Demeanor: Cooperative  Friendly  Speech      Rate / Production: Normal/ Responsive      Volume:  Normal  volume       Language:  intact  Mood:   Anxious   Affect:   Appropriate    Thought Content: Clear   Thought Process: Coherent       Associations: No loosening of associations  Insight:   Fair   Judgment:  Intact   Orientation:  All  Attention/concentration: Good        Substance Use:  Patient reported no family history of chemical health issues.  Patient has not received substance use disorder and/or gambling treatment in the past.  Patient has not ever been to detox.  Patient is not currently receiving any chemical dependency treatment. Patient reports they have attended the following support groups: AA in the past. Patient reports it was with a group of friends and did five to six groups.         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 Patient reports his last use he went to  with a rep and another colleague and then after blacked out and took an uber and went to the casino. Patient reports that is where he gets to black out state. Patient reports he could not tell how much he had to drink that night. Patient reports prior to that last use date- it had been Saturday prior he had drank (09/30/23). Patient reports he is usually out and does not keep count how much he drinks but he drinks too much. Patient reports it increased in frequency this past summer. Patient reports it was less often but similar amount and not always going to casino prior to this summer.     Patient reports he never jeremy throughout high school. Patient reports it was never really his thing prior to divorce either.  Two weeks ago tomorrow (10/3/23) No oral   has used Marijuana   12 or 13 Patient reports, hit or miss on using. After divorce started using it again and then got away from it. Patient reports, couple times a month and usually  "gummies.  Patient reports if he does that it helps him stay away from drinking.  7th weekend of October No smoked     has not used Amphetamines          has not used Cocaine/crack           has not used Hallucinogens        has not used Inhalants        has not used Heroin        has not used Other Opiates        has not used Benzodiazepine          has not used Barbiturates        has not used Over the counter meds.        has use Caffeine  Per patient: six drinks a day for pop. Diet pop 10/16/23 No oral   has used Nicotine  12 or 13 Per patient: pack a day 10/16/23 No smoked   has not used other substances not listed above:  Identify:             Patient reported the following problems as a result of their substance use: family problems, financial problems, occupational / vocational problems, and relationship problems.  Patient is concerned about substance use. Patient reports GF had panic moment and called his mother so they are concerned about their substance use.  Patient reports their recovery goals are be able to enjoy having a conversation with people and listening to them and get natural euphoria of being done with work. Patient reports when work is over that is scary to him. Patient reports to have some sort of accomplishment outside of work. 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 Patient reports he always tells his plumbing guys \"help me, help you\" and that is what he is looking for.      Patient reports experiencing the following withdrawal symptoms within the past 12 months: sweating, shaky/jittery/tremors, unable to sleep, agitation, headache, fatigue, sad/depressed feeling, vivid/unpleasant dreams, dizziness, diarrhea, diminished appetite, and anxiety/worry and the following within the past 30 days: sweating, shaky/jittery/tremors, unable to sleep, agitation, headache, fatigue, sad/depressed feeling, vivid/unpleasant dreams, dizziness, diarrhea, diminished appetite, and anxiety/worry.   Patients reports " urges to use Alcohol.  Patient reports he has used more Alcohol than intended or over a longer period of time than intended. Patient reports he has had unsuccessful attempts to cut down or control use of Alcohol. Patient reports longest period of abstinence was past winter-3-4 months of not using and return to use was due to trying to have a good time and once summer comes around. Patient reports he does not have group a sj or two to stop by it is going down to bars. Patient reports he was too busy during winter, demanding and expensive way of life and around kids do not drink. Patient reports spending more time with them and feeling good with them being with me allows that. Patient reports he is not trying to fill the void. Patient reports he has needed to use more Alcohol to achieve the same effect.      Patient does not report a great deal of time is spent in activities necessary to obtain, use, or recover from Alcohol effects.  Patient does not report important social, occupational, or recreational activities are given up or reduced because of Alcohol use.  Patient reports the following problem behaviors while under the influence of substances: try not to 99% of time for driving.     Patient reports substance use has not ever impacted their ability to function in a school setting. Patient reports substance use has ever impacted their ability to function in a work setting. Patient reports he will put his phone in airplane mode and not show up for an entire day.  Patients demographics and history impact their recovery in the following ways: work environment as typically alcohol or meals are involved in working with reps.  Patient reports engaging in the following recreation/leisure activities while using:  work engagements, socially.  Patient reports the following people are supportive of recovery: his family and GF.    Patient does have a history of gambling concerns and/or treatment.  Patient does not have  other addictive behaviors he is concerned about.        Dimension Scale Ratings:    Dimension 1 -  Acute Intoxication/Withdrawal: 0 - No Problem   Dimension 2 - Biomedical: 0 - No Problem   Dimension 3 - Emotional/Behavioral/Cognitive Conditions: 2 - Moderate Problem   Dimension 4 - Readiness to Change:  2 - Moderate Problem   Dimension 5 - Relapse/Continued Use/ Continued Problem Potential: 2 - Moderate Problem   Dimension 6 - Recovery Environment:  2 - Moderate Problem     Significant Losses / Trauma / Abuse / Neglect Issues:   Patient did not serve in the .  There are indications or report of significant loss, trauma, abuse or neglect issues related to: are indications or report of significant loss, trauma, abuse or neglect issues related to divorce in 2019, patient reports his ex-wife was controlling. 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    Concerns for possible neglect are not present.     Assessments:  The following assessments were completed by patient for this visit:  PHQ9:       9/30/2021     2:53 PM 1/14/2022     7:49 AM 2/27/2023     9:44 AM 3/27/2023     4:25 PM 10/16/2023     8:02 AM   PHQ-9 SCORE   PHQ-9 Total Score MyChart 17 (Moderately severe depression) 8 (Mild depression) 21 (Severe depression) 18 (Moderately severe depression)    PHQ-9 Total Score 17 8 21 18 20     GAD7:       9/30/2021     2:54 PM 1/14/2022     7:51 AM 2/27/2023     9:44 AM 3/27/2023     4:25 PM 10/16/2023     8:02 AM   ANOOP-7 SCORE   Total Score 19 (severe anxiety) 13 (moderate anxiety) 21 (severe anxiety) 18 (severe anxiety)    Total Score 19 13 21 18 18     CAGE-AID:        No data to display              PROMIS 10-Global Health (all questions and answers displayed):       10/16/2023     8:02 AM   PROMIS 10   In general, would you say your health is: 3   In general, would you say your quality of life is: 3   In general, how would  you rate your physical health? 3   In general, how would you rate your mental health, including your mood and your ability to think? 1   In general, how would you rate your satisfaction with your social activities and relationships? 1   In general, please rate how well you carry out your usual social activities and roles. (This includes activities at home, at work and in your community, and responsibilities as a parent, child, spouse, employee, friend, etc.) 2   To what extent are you able to carry out your everyday physical activities such as walking, climbing stairs, carrying groceries, or moving a chair? 3   In the past 7 days, how often have you been bothered by emotional problems such as feeling anxious, depressed, or irritable? 5   In the past 7 days, how would you rate your fatigue on average? 4   In the past 7 days, how would you rate your pain on average, where 0 means no pain, and 10 means worst imaginable pain? 0   Global Mental Health Score 6   Global Physical Health Score 13   PROMIS TOTAL - SUBSCORES 19     [place] Suicide Severity Rating Scale (Lifetime/Recent)      10/16/2023     1:00 PM   [place] Suicide Severity Rating (Lifetime/Recent)   Q1 Wish to be Dead (Lifetime) N   Q2 Non-Specific Active Suicidal Thoughts (Lifetime) N   Actual Attempt (Lifetime) N   Has subject engaged in non-suicidal self-injurious behavior? (Lifetime) N   Interrupted Attempts (Lifetime) N   Aborted or Self-Interrupted Attempt (Lifetime) N   Preparatory Acts or Behavior (Lifetime) N   Calculated C-SSRS Risk Score (Lifetime/Recent) No Risk Indicated     Patient reports no past SI or past attempts.     Safety Assessment:   Patient denies current homicidal ideation and behaviors.  Patient denies current self-injurious ideation and behaviors.    Patient denied risk behaviors associated with substance use.  Patient reported substance use associated with mental health symptoms.  Patient reports the following current concerns  for their personal safety: None.  Patient reports there are firearms in the house.   The firearms are secured in a locked space.    History of Safety Concerns:  Patient denied a history of homicidal ideation.     Patient denied a history of personal safety concerns.    Patient denied a history of assaultive behaviors.    Patient denied a history of sexual assault behaviors.     Patient reported a history unsafe motor vehicle operation associated with substance use.  Patient reported a history of substance use associated with mental health symptoms.  Patient reports the following protective factors:stable living environment, structured routine, future oriented.     Risk Plan:  See Recommendations for Safety and Risk Management Plan    Review of Symptoms per patient report:   Depression: Change in sleep, Lack of interest, Excessive or inappropriate guilt, Change in energy level, Change in appetite, Low self-worth, Ruminations, Irritability, and Feeling sad, down, or depressed, patient reports, sleep: what it used to be alarm would go off and he was going, now hit snooze for a hour and feel lazy and toss and turn all night. Patient reports his dreams are crazy. Patient reports he can fall asleep pretty good but staying asleep is tough. Patient reports he will wake up more panicked about different things regarding his responsibilities. Patient reports other times he will sleep hard and feels like it was only 20 minutes. Patient reports he has restless sleep, constantly restlessness in his legs. Patient reports 80% having hard time staying asleep. Patient reports feeling fatigued when waking up.  Patient does not feel dread for the day to day. Patient reports the thought of not having something to do is scary. Patient reports then he goes to thinking about going to casino or bar. Appetite-patient reports, times where he will eat and then times he knows he should eat but he will have zero appetite, but then sugar crashes  and he will have to force self. Patient reports he will go all day and then realize he did not eat but then his body will tell him he needs to eat but does not have appetite.    Sandy:  No Symptoms  Psychosis: No Symptoms  Anxiety: Excessive worry, Nervousness, Sleep disturbance, Ruminations, Poor concentration, and Irritability, patient reports he does garcia migraines. Patient reports he went to neurologist six years ago and was prescribed medication. Patient reports he has prescription and on his last pill. Patient reports one or two a year he will have. Patient reports without his medication he has been in ED due to them. Patient reports he had shingles right in his face and that is where he gets migraines.   Panic:  No symptoms, patient reports situational panic.   Post Traumatic Stress Disorder:  No Symptoms   Eating Disorder: No Symptoms  ADD / ADHD:  No symptoms  Conduct Disorder: No symptoms  Autism Spectrum Disorder: No symptoms  Obsessive Compulsive Disorder: No Symptoms  Substance Use:  blackouts, hangovers, work absence due to substance use, family relationship problems due to substance use, and social problems related to substance use     Patient reports the following compulsive behaviors and treatment history:  none identified .      Diagnostic Criteria:   Unspecified Anxiety Disorder   - Excessive anxiety and worry about a number of events or activities (such as work or school performance).    - The person finds it difficult to control the worry.   - Restlessness or feeling keyed up or on edge.    - Being easily fatigued.    - Difficulty concentrating or mind going blank.    - Irritability.    - Sleep disturbance (difficulty falling or staying asleep, or restless unsatisfying sleep).   - Depressed mood. Note: In children and adolescents, can be irritable mood.     - Diminished interest or pleasure in all, or almost all, activities.    - Significant weight loss when not dieting decrease in appetite.     - Decreased sleep.    - Fatigue or loss of energy.    - Feelings of worthlessness or inappropriate and excessive guilt.    - Diminished ability to think or concentrate, or indecisiveness.  Substance Use Disorder Substance is often taken in larger amounts or over a longer period than was intended.  Met for:  Alcohol There is persistent desire or unsuccessful efforts to cut down or control use of the substance.  Met for:  Alcohol Recurrent use of the substance resulting in a failure to fulfill major role obligations at work, school, or home.  Met for:  Alcohol Tolerance:  either a need for markedly increased amounts of the substance to achieve the desired effect or a markedly diminished effect with continued use of the same amount of the substance.  Met for:  Alcohol Withdrawal:  either patient endorses characteristic withdrawal syndrome for the substance or the substance (or closely related substance) is taken to relieve or avoid withdrawal symptoms.  Met for:  Alcohol    Functional Status:  Patient reports the following functional impairments:  relationship(s), self-care, social interactions, and work / vocational responsibilities.     Nonprogrammatic care:  Patient is requesting basic services to address current mental health concerns.    Clinical Summary:  1. Reason for assessment: seeking recommendations.  2. Psychosocial, Cultural and Contextual Factors: occupational stressors, co-parenting, relationship strain, substance use, financial concerns.   3. Principal DSM5 Diagnoses  (Sustained by DSM5 Criteria Listed Above):   311 (F32.9) Unspecified Depressive Disorder   300.00 (F41.9) Unspecified Anxiety Disorder.Rule Out MDD, Rule Out ANOOP.   4. Other Diagnoses that is relevant to services:   Substance-Related & Addictive Disorders Alcohol Use Disorder   303.90 (F10.20) Moderate. Rule Out Gambling Addiction  5. Provisional Diagnosis:  6. Prognosis: Expect Improvement and Relieve Acute Symptoms.  7. Likely  consequences of symptoms if not treated: Without treatment patient more than likely will experience a continuation of symptoms with decreased daily functioning, requiring an increased level of care. .  8. Client strengths include:  employed, motivated, and work history .     Recommendations:     1. Plan for Safety and Risk Management:   Safety and Risk: A safety and risk management plan has been developed including: Patient denied any current/recent/lifetime history of suicidal ideation and/or behaviors.  No safety plan indicated at this time. .          Report to child / adult protection services was NA.     2. Patient did not identify Restorationist, ethnic or cultural issues relevant to therapy at this time.Patient encouraged to ask for help should needs arise in the course of treatment.       3. Initial Treatment will focus on: Depressed Mood - increase coping skills to reduce anxiety and depression. Anxiety - increase coping skills to reduce anxiety and depression.     Alcohol / Substance Use - recovery skills .     4. Resources/Service Plan:    services are not indicated.   Modifications to assist communication are not indicated.   Additional disability accommodations are not indicated.      5. Collaboration:   Collaboration / coordination of treatment will be initiated with the following  support professionals:  none identified at this time .      6.  Referrals:   The following referral(s) will be initiated: Outpatient [facility]-ideally with a dually licensed provider to address both mental health and substance use.  Psychiatry.    A Release of Information has been obtained for the following:  none identified at this time .     Clinical Substantiation/medical necessity for the above recommendations:  Patient would appear to benefit from working with an individual therapist along with a psychiatric provider to address his symptoms, gain better coping strategies then utilizing gambling and  substance use (alcohol) and process the psychosocial factors that have increased his symptoms. Patient reports he does not currently have any providers helping him address his mental health. He reports brief history of working with pcp and a therapist in the past. . Patient reports he is able to function in his daily task and responsibilities. Patient did not report any safety concerns at this time.    7. JOYCELYN:    JOYCELYN:  Discussed the general effects of drugs and alcohol on health and well-being. Provider gave patient printed information about the effects of chemical use on their health and well being.   Recommendations:  Abstain from using all non-prescribed mood altering chemicals and substances. Take all medication as prescribed and directed by licensed physicians. If unable to abstain from alcohol and gambling or increase in either with engaging in the additional support services for mental health recommended above, recommendation would be attending a co-occurring IOP treatment program such as [facility].     8. Records:   These were reviewed at time of assessment.   Information in this assessment was obtained from the medical record and  provided by patient who is a good historian.    Patient will have open access to their mental health medical record.    9.   Interactive Complexity: No    10. Safety Plan:  Patient denied any current/recent/lifetime history of suicidal ideation and/or behaviors.  No safety plan indicated at this time.     Provider Name/ Credentials:  Mary Vera, [facility], LADC   October 16, 2023

## 2023-12-28 ENCOUNTER — TELEPHONE (OUTPATIENT)
Dept: FAMILY MEDICINE | Facility: CLINIC | Age: 38
End: 2023-12-28
Payer: COMMERCIAL

## 2023-12-28 NOTE — TELEPHONE ENCOUNTER
Patient Quality Outreach    Patient is due for the following:   Depression  -  PHQ-9 needed and DAP  Physical Preventive Adult Physical      Topic Date Due    Hepatitis B Vaccine (1 of 3 - 3-dose series) Never done    COVID-19 Vaccine (1) Never done    Pneumococcal Vaccine (1 of 2 - PCV) Never done    Diptheria Tetanus Pertussis (DTAP/TDAP/TD) Vaccine (2 - Td or Tdap) 02/25/2022    Flu Vaccine (1) 09/01/2023       Next Steps:   Schedule a Adult Preventative    Type of outreach:    Sent [employer] message.    Next Steps:  Reach out within 90 days via Letter.    Max number of attempts reached: No. Will try again in 90 days if patient still on fail list.    Questions for provider review:    None           Dev Mann MA

## 2024-03-02 ENCOUNTER — HEALTH MAINTENANCE LETTER (OUTPATIENT)
Age: 39
End: 2024-03-02

## 2025-03-15 ENCOUNTER — HEALTH MAINTENANCE LETTER (OUTPATIENT)
Age: 40
End: 2025-03-15